# Patient Record
Sex: MALE | Race: WHITE | Employment: PART TIME | URBAN - METROPOLITAN AREA
[De-identification: names, ages, dates, MRNs, and addresses within clinical notes are randomized per-mention and may not be internally consistent; named-entity substitution may affect disease eponyms.]

---

## 2017-08-25 ENCOUNTER — HOSPITAL ENCOUNTER (EMERGENCY)
Facility: HOSPITAL | Age: 22
Discharge: HOME | End: 2017-08-26
Attending: EMERGENCY MEDICINE | Admitting: SURGERY
Payer: COMMERCIAL

## 2017-08-25 ENCOUNTER — TELEPHONE (OUTPATIENT)
Dept: FAMILY MEDICINE CLINIC | Facility: CLINIC | Age: 22
End: 2017-08-25

## 2017-08-25 DIAGNOSIS — K37 APPENDICITIS: ICD-10-CM

## 2017-08-25 DIAGNOSIS — K35.80 ACUTE APPENDICITIS, UNSPECIFIED ACUTE APPENDICITIS TYPE: Primary | ICD-10-CM

## 2017-08-25 PROCEDURE — 96376 TX/PRO/DX INJ SAME DRUG ADON: CPT

## 2017-08-25 PROCEDURE — 96361 HYDRATE IV INFUSION ADD-ON: CPT

## 2017-08-25 PROCEDURE — 99285 EMERGENCY DEPT VISIT HI MDM: CPT

## 2017-08-25 PROCEDURE — 96375 TX/PRO/DX INJ NEW DRUG ADDON: CPT

## 2017-08-25 PROCEDURE — 83690 ASSAY OF LIPASE: CPT | Performed by: EMERGENCY MEDICINE

## 2017-08-25 PROCEDURE — 96374 THER/PROPH/DIAG INJ IV PUSH: CPT

## 2017-08-25 PROCEDURE — 85025 COMPLETE CBC W/AUTO DIFF WBC: CPT | Performed by: EMERGENCY MEDICINE

## 2017-08-25 PROCEDURE — 80053 COMPREHEN METABOLIC PANEL: CPT | Performed by: EMERGENCY MEDICINE

## 2017-08-25 RX ORDER — ONDANSETRON 2 MG/ML
4 INJECTION INTRAMUSCULAR; INTRAVENOUS ONCE
Status: COMPLETED | OUTPATIENT
Start: 2017-08-26 | End: 2017-08-26

## 2017-08-26 ENCOUNTER — SURGERY (OUTPATIENT)
Age: 22
End: 2017-08-26

## 2017-08-26 ENCOUNTER — ANESTHESIA (OUTPATIENT)
Dept: SURGERY | Facility: HOSPITAL | Age: 22
End: 2017-08-26
Payer: COMMERCIAL

## 2017-08-26 ENCOUNTER — APPOINTMENT (OUTPATIENT)
Dept: CT IMAGING | Facility: HOSPITAL | Age: 22
End: 2017-08-26
Attending: EMERGENCY MEDICINE
Payer: COMMERCIAL

## 2017-08-26 ENCOUNTER — ANESTHESIA EVENT (OUTPATIENT)
Dept: SURGERY | Facility: HOSPITAL | Age: 22
End: 2017-08-26
Payer: COMMERCIAL

## 2017-08-26 VITALS
TEMPERATURE: 99 F | RESPIRATION RATE: 19 BRPM | HEART RATE: 90 BPM | WEIGHT: 195 LBS | HEIGHT: 71 IN | DIASTOLIC BLOOD PRESSURE: 69 MMHG | OXYGEN SATURATION: 98 % | SYSTOLIC BLOOD PRESSURE: 132 MMHG | BODY MASS INDEX: 27.3 KG/M2

## 2017-08-26 PROBLEM — K35.80 ACUTE APPENDICITIS, UNSPECIFIED ACUTE APPENDICITIS TYPE: Status: ACTIVE | Noted: 2017-08-26

## 2017-08-26 PROBLEM — K35.80 ACUTE APPENDICITIS: Status: ACTIVE | Noted: 2017-08-26

## 2017-08-26 LAB
ALBUMIN SERPL-MCNC: 4.1 G/DL (ref 3.5–4.8)
ALP LIVER SERPL-CCNC: 51 U/L (ref 45–117)
ALT SERPL-CCNC: 34 U/L (ref 17–63)
AST SERPL-CCNC: 24 U/L (ref 15–41)
BASOPHILS # BLD AUTO: 0.06 X10(3) UL (ref 0–0.1)
BASOPHILS NFR BLD AUTO: 0.3 %
BILIRUB SERPL-MCNC: 0.6 MG/DL (ref 0.1–2)
BUN BLD-MCNC: 14 MG/DL (ref 8–20)
CALCIUM BLD-MCNC: 9.4 MG/DL (ref 8.3–10.3)
CHLORIDE: 105 MMOL/L (ref 101–111)
CO2: 25 MMOL/L (ref 22–32)
CREAT BLD-MCNC: 1.07 MG/DL (ref 0.7–1.3)
EOSINOPHIL # BLD AUTO: 0.17 X10(3) UL (ref 0–0.3)
EOSINOPHIL NFR BLD AUTO: 0.9 %
ERYTHROCYTE [DISTWIDTH] IN BLOOD BY AUTOMATED COUNT: 12.8 % (ref 11.5–16)
GLUCOSE BLD-MCNC: 102 MG/DL (ref 70–99)
HCT VFR BLD AUTO: 40.1 % (ref 37–53)
HGB BLD-MCNC: 13.9 G/DL (ref 13–17)
IMMATURE GRANULOCYTE COUNT: 0.09 X10(3) UL (ref 0–1)
IMMATURE GRANULOCYTE RATIO %: 0.5 %
LIPASE: 137 U/L (ref 73–393)
LYMPHOCYTES # BLD AUTO: 2.22 X10(3) UL (ref 0.9–4)
LYMPHOCYTES NFR BLD AUTO: 11.9 %
M PROTEIN MFR SERPL ELPH: 7.2 G/DL (ref 6.1–8.3)
MCH RBC QN AUTO: 29.7 PG (ref 27–33.2)
MCHC RBC AUTO-ENTMCNC: 34.7 G/DL (ref 31–37)
MCV RBC AUTO: 85.7 FL (ref 80–99)
MONOCYTES # BLD AUTO: 1.35 X10(3) UL (ref 0.1–0.6)
MONOCYTES NFR BLD AUTO: 7.2 %
NEUTROPHIL ABS PRELIM: 14.74 X10 (3) UL (ref 1.3–6.7)
NEUTROPHILS # BLD AUTO: 14.74 X10(3) UL (ref 1.3–6.7)
NEUTROPHILS NFR BLD AUTO: 79.2 %
PLATELET # BLD AUTO: 249 10(3)UL (ref 150–450)
POTASSIUM SERPL-SCNC: 3.4 MMOL/L (ref 3.6–5.1)
RBC # BLD AUTO: 4.68 X10(6)UL (ref 4.3–5.7)
RED CELL DISTRIBUTION WIDTH-SD: 39.7 FL (ref 35.1–46.3)
SODIUM SERPL-SCNC: 139 MMOL/L (ref 136–144)
WBC # BLD AUTO: 18.6 X10(3) UL (ref 4–13)

## 2017-08-26 PROCEDURE — 88304 TISSUE EXAM BY PATHOLOGIST: CPT | Performed by: SURGERY

## 2017-08-26 PROCEDURE — 74177 CT ABD & PELVIS W/CONTRAST: CPT | Performed by: EMERGENCY MEDICINE

## 2017-08-26 PROCEDURE — 0DTJ4ZZ RESECTION OF APPENDIX, PERCUTANEOUS ENDOSCOPIC APPROACH: ICD-10-PCS | Performed by: SURGERY

## 2017-08-26 RX ORDER — HYDROCODONE BITARTRATE AND ACETAMINOPHEN 5; 325 MG/1; MG/1
1-2 TABLET ORAL EVERY 4 HOURS PRN
Qty: 30 TABLET | Refills: 0 | Status: SHIPPED | OUTPATIENT
Start: 2017-08-26 | End: 2017-09-05

## 2017-08-26 RX ORDER — HYDROMORPHONE HYDROCHLORIDE 1 MG/ML
0.5 INJECTION, SOLUTION INTRAMUSCULAR; INTRAVENOUS; SUBCUTANEOUS EVERY 30 MIN PRN
Status: DISCONTINUED | OUTPATIENT
Start: 2017-08-26 | End: 2017-08-26

## 2017-08-26 RX ORDER — ONDANSETRON 2 MG/ML
4 INJECTION INTRAMUSCULAR; INTRAVENOUS EVERY 4 HOURS PRN
Status: CANCELLED | OUTPATIENT
Start: 2017-08-26

## 2017-08-26 RX ORDER — ONDANSETRON 2 MG/ML
4 INJECTION INTRAMUSCULAR; INTRAVENOUS AS NEEDED
Status: DISCONTINUED | OUTPATIENT
Start: 2017-08-26 | End: 2017-08-26

## 2017-08-26 RX ORDER — SODIUM CHLORIDE 9 MG/ML
INJECTION, SOLUTION INTRAVENOUS CONTINUOUS
Status: CANCELLED | OUTPATIENT
Start: 2017-08-26 | End: 2017-08-26

## 2017-08-26 RX ORDER — HYDROMORPHONE HYDROCHLORIDE 1 MG/ML
INJECTION, SOLUTION INTRAMUSCULAR; INTRAVENOUS; SUBCUTANEOUS
Status: COMPLETED
Start: 2017-08-26 | End: 2017-08-26

## 2017-08-26 RX ORDER — ACETAMINOPHEN 500 MG
1000 TABLET ORAL ONCE AS NEEDED
Status: DISCONTINUED | OUTPATIENT
Start: 2017-08-26 | End: 2017-08-26

## 2017-08-26 RX ORDER — DIPHENHYDRAMINE HYDROCHLORIDE 50 MG/ML
12.5 INJECTION INTRAMUSCULAR; INTRAVENOUS AS NEEDED
Status: DISCONTINUED | OUTPATIENT
Start: 2017-08-26 | End: 2017-08-26

## 2017-08-26 RX ORDER — HYDROCODONE BITARTRATE AND ACETAMINOPHEN 5; 325 MG/1; MG/1
1 TABLET ORAL AS NEEDED
Status: DISCONTINUED | OUTPATIENT
Start: 2017-08-26 | End: 2017-08-26

## 2017-08-26 RX ORDER — HYDROMORPHONE HYDROCHLORIDE 1 MG/ML
0.5 INJECTION, SOLUTION INTRAMUSCULAR; INTRAVENOUS; SUBCUTANEOUS EVERY 30 MIN PRN
Status: CANCELLED | OUTPATIENT
Start: 2017-08-26 | End: 2017-08-26

## 2017-08-26 RX ORDER — MEPERIDINE HYDROCHLORIDE 25 MG/ML
12.5 INJECTION INTRAMUSCULAR; INTRAVENOUS; SUBCUTANEOUS AS NEEDED
Status: DISCONTINUED | OUTPATIENT
Start: 2017-08-26 | End: 2017-08-26

## 2017-08-26 RX ORDER — BUPIVACAINE HYDROCHLORIDE AND EPINEPHRINE 2.5; 5 MG/ML; UG/ML
INJECTION, SOLUTION EPIDURAL; INFILTRATION; INTRACAUDAL; PERINEURAL AS NEEDED
Status: DISCONTINUED | OUTPATIENT
Start: 2017-08-26 | End: 2017-08-26

## 2017-08-26 RX ORDER — HYDROMORPHONE HYDROCHLORIDE 1 MG/ML
0.4 INJECTION, SOLUTION INTRAMUSCULAR; INTRAVENOUS; SUBCUTANEOUS EVERY 5 MIN PRN
Status: DISCONTINUED | OUTPATIENT
Start: 2017-08-26 | End: 2017-08-26

## 2017-08-26 RX ORDER — MIDAZOLAM HYDROCHLORIDE 1 MG/ML
1 INJECTION INTRAMUSCULAR; INTRAVENOUS EVERY 5 MIN PRN
Status: DISCONTINUED | OUTPATIENT
Start: 2017-08-26 | End: 2017-08-26

## 2017-08-26 RX ORDER — NALOXONE HYDROCHLORIDE 0.4 MG/ML
80 INJECTION, SOLUTION INTRAMUSCULAR; INTRAVENOUS; SUBCUTANEOUS AS NEEDED
Status: DISCONTINUED | OUTPATIENT
Start: 2017-08-26 | End: 2017-08-26

## 2017-08-26 RX ORDER — SODIUM CHLORIDE, SODIUM LACTATE, POTASSIUM CHLORIDE, CALCIUM CHLORIDE 600; 310; 30; 20 MG/100ML; MG/100ML; MG/100ML; MG/100ML
INJECTION, SOLUTION INTRAVENOUS CONTINUOUS
Status: DISCONTINUED | OUTPATIENT
Start: 2017-08-26 | End: 2017-08-26

## 2017-08-26 RX ORDER — HYDROCODONE BITARTRATE AND ACETAMINOPHEN 5; 325 MG/1; MG/1
2 TABLET ORAL AS NEEDED
Status: DISCONTINUED | OUTPATIENT
Start: 2017-08-26 | End: 2017-08-26

## 2017-08-26 NOTE — ED PROVIDER NOTES
Patient Seen in: [facility] Emergency Department    History   Patient presents with:  Abdomen/Flank Pain (GI/)    Stated Complaint: mid abd pain    HPI    25-year-old male presents the emergency department for complaints of abdominal pain.   Jamie Normocephalic, atraumatic. Pupils are equally round and reactive to light. Extraocular movements are intact. Oropharynx is clear. Uvula is midline. Tympanic membranes are clear without evidence of injection or perforation. Neck exam: Supple.   There i Course  ------------------------------------------------------------   Patient was brought back to the examination room immediately. The patient was then placed on a cardiac monitor and pulse oximetry was applied.   Patient had an IV established and labs w

## 2017-08-26 NOTE — OPERATIVE REPORT
PREOPERATIVE DIAGNOSIS: Right lower quadrant abdominal pain. POSTOPERATIVE DIAGNOSIS: Acute appendicitis. PROCEDURE PERFORMED: Laparoscopic appendectomy. ASSISTANT: None. ANESTHESIA: General.     ESTIMATED BLOOD LOSS: 10 mL.     COMPLICATIONS: N removed. There was no evidence of any ongoing bleeding at the end of procedure. The abdomen was then desufflated. The fascia and 11 mm trocar site was then reapproximated using interrupted Maxon stitch.  All wounds were then closed using subcuticular Monocr

## 2017-08-26 NOTE — ED INITIAL ASSESSMENT (HPI)
Patient sts after he was golfing today had abd pain umbilical pain, + emesis x3- denies diarrhea. Denies fevers.

## 2017-08-26 NOTE — TELEPHONE ENCOUNTER
Mom paged after hours, pt w/ acute onset of mid/jake-umbilical abd pain and cramping starting about one hour ago. Had multiple bouts of emesis. Having nausea. No diarrhea. No fevers. Has been having chills. Tried some Tums without improvement.   Pain

## 2017-08-26 NOTE — ANESTHESIA PREPROCEDURE EVALUATION
PRE-OP EVALUATION    Patient Name: Charles Goyal    Pre-op Diagnosis: Appendicitis [K37]    Procedure(s):  LAPAROSCOPIC APPENDECTOMY POSSIBLE OPEN    Surgeon(s) and Role:     Cayden Wild MD - Primary    Pre-op vitals reviewed.   Temp: 98.9 °F (37.2 °C) 08/25/2017   K 3.4 (L) 08/25/2017    08/25/2017   CO2 25.0 08/25/2017   BUN 14 08/25/2017   CREATSERUM 1.07 08/25/2017    (H) 08/25/2017   CA 9.4 08/25/2017            Airway      Mallampati: II  Mouth opening: >3 FB  TM distance: > 6 cm  Neck

## 2017-08-26 NOTE — H&P
[facility]  Report of Consultation    Eddy Sepulveda Patient Status:  Emergency    1995 MRN [id number]   Location [facility] Attending Silvia Hearn MD   Hosp Day # 0 PCP Margarito Tao MD     Reason for Consultation:  RL (DEMEROL) 25 MG/ML injection 12.5 mg, 12.5 mg, Intravenous, PRN  •  DiphenhydrAMINE HCl (BENADRYL) injection 12.5 mg, 12.5 mg, Intravenous, PRN  •  Midazolam HCl (VERSED) 2 MG/2ML injection 1 mg, 1 mg, Intravenous, Q5 Min PRN    Review of Systems:  Pertine Mother at bedside. I spent 40 minutes face to face with the patient. More than 50% of that time was spent counseling the patient and/or on coordination of care. The diagnosis, prognosis, and general treatment was explained to the patient and the family.

## 2017-08-28 NOTE — TELEPHONE ENCOUNTER
Called and spoke to mother, states patient is back in school and does have a follow up appointment with surgeon in 2 weeks.

## 2017-08-30 ENCOUNTER — TELEPHONE (OUTPATIENT)
Dept: SURGERY | Facility: CLINIC | Age: 22
End: 2017-08-30

## 2017-08-30 ENCOUNTER — TELEPHONE (OUTPATIENT)
Dept: FAMILY MEDICINE CLINIC | Facility: CLINIC | Age: 22
End: 2017-08-30

## 2017-08-30 DIAGNOSIS — K35.890 OTHER ACUTE APPENDICITIS: ICD-10-CM

## 2017-08-30 DIAGNOSIS — K35.80 ACUTE APPENDICITIS, UNSPECIFIED ACUTE APPENDICITIS TYPE: Primary | ICD-10-CM

## 2017-08-30 DIAGNOSIS — Z90.49 S/P APPENDECTOMY: ICD-10-CM

## 2017-08-30 NOTE — TELEPHONE ENCOUNTER
Pt called today regarding his post op appointment for appendectomy on 8-. Patient went back to college in [place] and can not make it back for a post operative appointment. He wants to follow up with his PCP in [place] Dr. Selene Xavier.   I told him I would t

## 2017-08-30 NOTE — TELEPHONE ENCOUNTER
Fina Bui          8/30/17 12:31 PM   Note      Called Robin Grew back after I talked to Dr. Courtney Lacy.   Told patient he could follow up with his PCP in [place] postoperatively per Dr. Yasmani Mays          8/30/17 11:47 AM   Note      Pt nicole

## 2017-08-30 NOTE — TELEPHONE ENCOUNTER
**LEFT MSG ON FRONT OFFICE VM**    Did not speak to pt. Away at college and had an appendectomy last week. Has wound care and follow-up condition questions. Pls call. Chart noted:      Follow up with Osmel Alvarez MD (SURGERY, GENERAL) in 2 wee

## 2017-08-30 NOTE — TELEPHONE ENCOUNTER
Pt is back at school in [place]    Was not planning on seeing Dr Zuleyma De La Torre that he direct his questions to the surgical nurses as we are not able to see what his post op incisions look like  (sounds like drain was removed and there are just steri stri

## 2017-08-30 NOTE — TELEPHONE ENCOUNTER
Spoke with mother, pt is allowed to see a provider close to school per his insurance    They will schedule that appt and request records if needed from  Dr Kash Lyons    Mother made appt for Physical in [place] when son is home from school    Future Appointments

## 2017-08-30 NOTE — TELEPHONE ENCOUNTER
Pt's mom called. Asked if her son had called and if we will forward the required documents to the school. I explained, per the TE written in here, that all questions should be directed to the surgeon's office.     Mom was confused and asked again if we

## 2017-08-30 NOTE — TELEPHONE ENCOUNTER
Called Manuela back after I talked to Dr. Layton Haynes. Told patient he could follow up with his PCP in [place] postoperatively per Dr. Layton Haynes.

## 2017-08-30 NOTE — TELEPHONE ENCOUNTER
Pt is HMO and we have not seen him since 6/2016    Records from Surgery on 8/25/17 in EMR    Will place F/U referral to surgeon    Dr Christy Roberson do you want to see him also? No appointments schedule for any F/U at this time    No future appointments.

## 2017-08-31 ENCOUNTER — TELEPHONE (OUTPATIENT)
Dept: SURGERY | Facility: CLINIC | Age: 22
End: 2017-08-31

## 2017-08-31 NOTE — TELEPHONE ENCOUNTER
Mother called about post op appt in [place], asking for a referral to see PCP in [place]. Explained that we do not do referrals and that the option is that pt could be seen here.  Also spoke with Dr. Jevon Wood in [place] and pt has an appt there and necessary document

## 2017-09-05 ENCOUNTER — TELEPHONE (OUTPATIENT)
Dept: SURGERY | Facility: CLINIC | Age: 22
End: 2017-09-05

## 2017-09-05 NOTE — TELEPHONE ENCOUNTER
Spoke with pt again.  Advised that he really needs to direct his concerns to Dr Radha Osman office as we never saw his for a hosp F/U    Pt understands and will call Dr Radha Osman office    Pt states he also has F/U appt on Friday with the [place]

## 2017-09-05 NOTE — TELEPHONE ENCOUNTER
Patient called from [facility] in [place]. He had laparoscopic appendectomy done 8/26/17 with Dr. Lechuga Born at [facility]. C/o redness/white discharge from incision. Denies any fever, pain or warmth to the incision. Patient states he feels great.  Patient has a

## 2017-09-05 NOTE — TELEPHONE ENCOUNTER
Pt called from [address] [place] [address]. Has a question about one of his incisions. I explained I remember him calling last week and thought the surgeon's office was answering the calls. Pt stated he \"wasn't sure about that\".   I confirmed he knew he was calling hi

## 2017-09-29 NOTE — PROGRESS NOTES
[facility]    Patients Name: Margarito Sevilla  Attending Physician: Jamila att. providers found  CSN: [id number]    Location:  [address] [place] [address] OR [facility]/ OR [facility]*  MRN: [id number]    YOB: 1995  Admission Date: 8/25/2017     Anesthesia Post-op Note    Proce

## 2017-11-22 ENCOUNTER — OFFICE VISIT (OUTPATIENT)
Dept: FAMILY MEDICINE CLINIC | Facility: CLINIC | Age: 22
End: 2017-11-22

## 2017-11-22 VITALS
OXYGEN SATURATION: 98 % | SYSTOLIC BLOOD PRESSURE: 118 MMHG | HEIGHT: 71 IN | RESPIRATION RATE: 18 BRPM | DIASTOLIC BLOOD PRESSURE: 72 MMHG | TEMPERATURE: 98 F | WEIGHT: 207.5 LBS | HEART RATE: 70 BPM | BODY MASS INDEX: 29.05 KG/M2

## 2017-11-22 DIAGNOSIS — Z00.00 ROUTINE GENERAL MEDICAL EXAMINATION AT A HEALTH CARE FACILITY: Primary | ICD-10-CM

## 2017-11-22 DIAGNOSIS — Z23 NEED FOR VACCINATION: ICD-10-CM

## 2017-11-22 PROCEDURE — 90472 IMMUNIZATION ADMIN EACH ADD: CPT | Performed by: FAMILY MEDICINE

## 2017-11-22 PROCEDURE — 99395 PREV VISIT EST AGE 18-39: CPT | Performed by: FAMILY MEDICINE

## 2017-11-22 PROCEDURE — 90714 TD VACC NO PRESV 7 YRS+ IM: CPT | Performed by: FAMILY MEDICINE

## 2017-11-22 PROCEDURE — 90686 IIV4 VACC NO PRSV 0.5 ML IM: CPT | Performed by: FAMILY MEDICINE

## 2017-11-22 PROCEDURE — 90471 IMMUNIZATION ADMIN: CPT | Performed by: FAMILY MEDICINE

## 2017-11-22 NOTE — PROGRESS NOTES
Michael Miller is a 25year old male here for Patient presents with: Well Adult: Physical    HPI:   Patient is seen for annual physical.    Diet and exercise: states has been eating out more and not watching his diet, not exercising.     PAST MEDICAL HISTORY dysfunction or low libido. No testicular or scrotal swelling or tenderness. No nocturia or difficulty with urine stream.  Rheumatologic: no joint pain, swelling, stiffness. No myalgias. No history of autoimmune disorder.   Derm/Skin: No rash or atypical ski adult.    Diagnoses and all orders for this visit:    Routine general medical examination at a health care facility  -     CBC WITH DIFFERENTIAL WITH PLATELET; Future  -     ASSAY, THYROID STIM HORMONE; Future  -     LIPID PANEL;  Future  -     COMP METABOL

## 2017-11-22 NOTE — PATIENT INSTRUCTIONS
Prevention Guidelines, Men Ages 25 to 44  Screening tests and vaccines are an important part of managing your health. Health counseling is essential, too. Below are guidelines for these, for men ages 25 to 44.  Talk with your healthcare provider to make s Hepatitis B Men at increased risk for infection – talk with your healthcare provider 3 doses over 6 months; second dose should be given 1 month after the first dose; the third dose should be given at least 2 months after the second dose and at least 4 yu © 4774-3189 [employer] [address]. [address] [facility], [address] [place]. All rights reserved. This information is not intended as a substitute for professional medical care. Always follow your healthcare professional's instructions.         Eating

## 2018-07-24 ENCOUNTER — OFFICE VISIT (OUTPATIENT)
Dept: FAMILY MEDICINE CLINIC | Facility: CLINIC | Age: 23
End: 2018-07-24

## 2018-07-24 VITALS
RESPIRATION RATE: 18 BRPM | HEART RATE: 64 BPM | WEIGHT: 211.13 LBS | TEMPERATURE: 98 F | OXYGEN SATURATION: 98 % | BODY MASS INDEX: 28.91 KG/M2 | SYSTOLIC BLOOD PRESSURE: 122 MMHG | HEIGHT: 71.5 IN | DIASTOLIC BLOOD PRESSURE: 78 MMHG

## 2018-07-24 DIAGNOSIS — Z02.89 ENCOUNTER FOR COMPLETION OF FORM WITH PATIENT: Primary | ICD-10-CM

## 2018-07-24 DIAGNOSIS — Z11.1 SCREENING EXAMINATION FOR PULMONARY TUBERCULOSIS: ICD-10-CM

## 2018-07-24 PROBLEM — K35.80 ACUTE APPENDICITIS, UNSPECIFIED ACUTE APPENDICITIS TYPE: Status: RESOLVED | Noted: 2017-08-26 | Resolved: 2018-07-24

## 2018-07-24 PROBLEM — K35.80 ACUTE APPENDICITIS: Status: RESOLVED | Noted: 2017-08-26 | Resolved: 2018-07-24

## 2018-07-24 PROCEDURE — 99213 OFFICE O/P EST LOW 20 MIN: CPT | Performed by: FAMILY MEDICINE

## 2018-07-24 PROCEDURE — 86580 TB INTRADERMAL TEST: CPT | Performed by: FAMILY MEDICINE

## 2018-07-24 RX ORDER — FLUTICASONE PROPIONATE 50 MCG
SPRAY, SUSPENSION (ML) NASAL CONTINUOUS PRN
COMMUNITY

## 2018-07-24 NOTE — PROGRESS NOTES
Syed Valiente is a 26year old male. Patient presents with:  Complete Form: Statement of good health for employer. 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     ALLERGY:   N

## 2018-07-26 ENCOUNTER — APPOINTMENT (OUTPATIENT)
Dept: FAMILY MEDICINE CLINIC | Facility: CLINIC | Age: 23
End: 2018-07-26

## 2018-07-26 LAB
INDURATION (): 0 MM (ref 0–11)
NAME OF  READER: NORMAL

## 2020-07-02 ENCOUNTER — TELEPHONE (OUTPATIENT)
Dept: FAMILY MEDICINE CLINIC | Facility: CLINIC | Age: 25
End: 2020-07-02

## 2020-07-02 NOTE — TELEPHONE ENCOUNTER
Patient left message saying he has not seen dr in a long time ( not since 2018) , he said he needs a doctors note to allow him to get the covid test , said he is not sure if he needs one or not     Patient went to [facility] in [place] this past w

## 2020-07-06 ENCOUNTER — TELEPHONE (OUTPATIENT)
Dept: FAMILY MEDICINE CLINIC | Facility: CLINIC | Age: 25
End: 2020-07-06

## 2023-12-15 ENCOUNTER — HOSPITAL ENCOUNTER (EMERGENCY)
Facility: HOSPITAL | Age: 28
Discharge: HOME | End: 2023-12-15
Attending: EMERGENCY MEDICINE
Payer: COMMERCIAL

## 2023-12-15 ENCOUNTER — APPOINTMENT (OUTPATIENT)
Dept: ULTRASOUND IMAGING | Facility: HOSPITAL | Age: 28
End: 2023-12-15
Attending: EMERGENCY MEDICINE
Payer: COMMERCIAL

## 2023-12-15 ENCOUNTER — TELEPHONE (OUTPATIENT)
Dept: FAMILY MEDICINE CLINIC | Facility: CLINIC | Age: 28
End: 2023-12-15

## 2023-12-15 VITALS
BODY MASS INDEX: 30.1 KG/M2 | WEIGHT: 215 LBS | HEIGHT: 71 IN | DIASTOLIC BLOOD PRESSURE: 94 MMHG | SYSTOLIC BLOOD PRESSURE: 138 MMHG | HEART RATE: 66 BPM | RESPIRATION RATE: 18 BRPM | TEMPERATURE: 98 F | OXYGEN SATURATION: 98 %

## 2023-12-15 DIAGNOSIS — N43.3 HYDROCELE, UNSPECIFIED HYDROCELE TYPE: ICD-10-CM

## 2023-12-15 DIAGNOSIS — N50.811 PAIN IN RIGHT TESTICLE: ICD-10-CM

## 2023-12-15 LAB
BILIRUB UR QL STRIP.AUTO: NEGATIVE
CLARITY UR REFRACT.AUTO: CLEAR
GLUCOSE UR STRIP.AUTO-MCNC: NORMAL MG/DL
LEUKOCYTE ESTERASE UR QL STRIP.AUTO: NEGATIVE
NITRITE UR QL STRIP.AUTO: NEGATIVE
PH UR STRIP.AUTO: 6 [PH] (ref 5–8)
PROT UR STRIP.AUTO-MCNC: NEGATIVE MG/DL
RBC UR QL AUTO: NEGATIVE
SP GR UR STRIP.AUTO: 1.01 (ref 1–1.03)
UROBILINOGEN UR STRIP.AUTO-MCNC: NORMAL MG/DL

## 2023-12-15 PROCEDURE — 81003 URINALYSIS AUTO W/O SCOPE: CPT | Performed by: EMERGENCY MEDICINE

## 2023-12-15 PROCEDURE — 87591 N.GONORRHOEAE DNA AMP PROB: CPT | Performed by: EMERGENCY MEDICINE

## 2023-12-15 PROCEDURE — 87661 TRICHOMONAS VAGINALIS AMPLIF: CPT | Performed by: EMERGENCY MEDICINE

## 2023-12-15 PROCEDURE — 76870 US EXAM SCROTUM: CPT | Performed by: EMERGENCY MEDICINE

## 2023-12-15 PROCEDURE — 87491 CHLMYD TRACH DNA AMP PROBE: CPT | Performed by: EMERGENCY MEDICINE

## 2023-12-15 PROCEDURE — 93975 VASCULAR STUDY: CPT | Performed by: EMERGENCY MEDICINE

## 2023-12-15 PROCEDURE — 99284 EMERGENCY DEPT VISIT MOD MDM: CPT

## 2023-12-15 NOTE — TELEPHONE ENCOUNTER
Patient called on call number. Returned call. States he noted last night he had some swelling and pain in the right testicle. He was unable to fall asleep due to discomfort. 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 Is taking ibuprofen for pain. Denies any injury to the area. Urine clear yellow with no noted blood. Denies fever. Reviewed with Dr. Rose Marie Carrillo and advised to go to IC/ED for evaluation. Advised he has not been seen in the office since 2018. Once he is evaluated in the IC/ED call for appointment with Dr. Rose Marie Carrillo to reestablish care. She needs to see him to review his health status and to keep up with preventative care. Verbalizes understanding and will schedule appt.

## 2023-12-18 LAB
C TRACH DNA SPEC QL NAA+PROBE: NEGATIVE
N GONORRHOEA DNA SPEC QL NAA+PROBE: NEGATIVE
TRICH VAG NAA: NEGATIVE

## 2024-01-11 ENCOUNTER — OFFICE VISIT (OUTPATIENT)
Dept: SURGERY | Facility: CLINIC | Age: 29
End: 2024-01-11

## 2024-01-11 DIAGNOSIS — N45.1 EPIDIDYMITIS: Primary | ICD-10-CM

## 2024-01-11 DIAGNOSIS — R82.90 URINE FINDING: ICD-10-CM

## 2024-01-11 LAB
APPEARANCE: CLEAR
BILIRUBIN: NEGATIVE
GLUCOSE (URINE DIPSTICK): NEGATIVE MG/DL
KETONES (URINE DIPSTICK): NEGATIVE MG/DL
LEUKOCYTES: NEGATIVE
MULTISTIX LOT#: NORMAL NUMERIC
NITRITE, URINE: NEGATIVE
OCCULT BLOOD: NEGATIVE
PH, URINE: 6 (ref 4.5–8)
PROTEIN (URINE DIPSTICK): NEGATIVE MG/DL
SPECIFIC GRAVITY: 1.01 (ref 1–1.03)
URINE-COLOR: YELLOW
UROBILINOGEN,SEMI-QN: 0.2 MG/DL (ref 0–1.9)

## 2024-01-11 PROCEDURE — 99204 OFFICE O/P NEW MOD 45 MIN: CPT

## 2024-01-11 PROCEDURE — 81003 URINALYSIS AUTO W/O SCOPE: CPT

## 2024-01-11 RX ORDER — LORATADINE 10 MG/1
10 TABLET, ORALLY DISINTEGRATING ORAL DAILY
COMMUNITY

## 2024-01-11 NOTE — PROGRESS NOTES
[facility], [facility]    Urology Consult Note    History of Present Illness:   Patient is a(n) 28 year old male without significant pmhx presents for hydrocele consult.    Pt went to ED on 12/15/23 for right testicular pain that began when he was laying in bed. No urinary complaints. Scrotal US showed small bilateral hydroceles.     Pain has improved since ED visit and has been intermittent since then. No hx of similar sx. Has not done anything strenuous recently.     Has been trying to increase water intake, usually drinks lots of water. BM regular.     HISTORY:  Past Medical History:   Diagnosis Date    CHICKEN POX     Chicken pox       Past Surgical History:   Procedure Laterality Date    APPENDECTOMY  09/2017      Family History   Problem Relation Age of Onset    Diabetes Maternal Grandfather       Social History:   Social History     Socioeconomic History    Marital status:    Tobacco Use    Smoking status: Never    Smokeless tobacco: Never   Substance and Sexual Activity    Alcohol use: Yes     Alcohol/week: 0.0 standard drinks of alcohol     Comment: beer 1 a once a week 2-3 beers    Drug use: No    Sexual activity: Yes        Allergies  No Known Allergies    Review of Systems:   A 10-point review of systems was completed and is negative other than as noted above.    Physical Exam:   There were no vitals taken for this visit.    GENERAL APPEARANCE: no acute distress  NEUROLOGIC: converses appropriately  HEAD: atraumatic, normocephalic  LUNGS: non-labored breathing  ABDOMEN: soft, nontender, non-distended  BACK: no CVA tenderness  PSYCH: appropriate affect and mood    Results:     Laboratory Data:  Lab Results   Component Value Date    WBC 18.6 (H) 08/25/2017    HGB 13.9 08/25/2017    .0 08/25/2017     Lab Results   Component Value Date     08/25/2017    K 3.4 (L) 08/25/2017     08/25/2017    CO2 25.0 08/25/2017    BUN 14 08/25/2017     (H)  08/25/2017    AST 24 08/25/2017    ALT 34 08/25/2017    TP 7.2 08/25/2017    ALB 4.1 08/25/2017    CA 9.4 08/25/2017       Urinalysis Results (last 3 years):  Recent Labs     12/15/23  1833   COLORUR Light-Yellow   CLARITY Clear   SPECGRAVITY 1.013   PHURINE 6.0   PROUR Negative   GLUUR Normal   KETUR Trace*   BILUR Negative   BLOODURINE Negative   NITRITE Negative   UROBILINOGEN Normal   LEUUR Negative       Urine Culture Results (last 3 years):  No results found for: \"URINECUL\"    Imaging  US SCROTUM W/ DOPPLER (CPT=93975/05732)    Result Date: 12/15/2023  PROCEDURE:  US SCROTUM W/ DOPPLER (CPT=93975/00505)  COMPARISON:  None.  INDICATIONS:  Right testicular pain, eval for torsion  TECHNIQUE:  Real time grey scale ultrasound was performed of the scrotal contents including the testicles, epididymis, spermatic cord, and scrotal wall. A duplex scan with B-mode, Doppler color flow, and spectral analysis were also performed.  PATIENT STATED HISTORY: (As transcribed by Technologist)     FINDINGS:  TESTES:  Normal appearance with normal arterial and venous flow. EPIDIDYMIS:  Negative. HYDROCELE:  Small bilateral VARICOCELE:  Negative            CONCLUSION:  1. No sonographic evidence for testicular torsion as clinically question. 2. Small bilateral hydroceles.   LOCATION:  [facility]    Dictated by (CST): Landy Mares MD on 12/15/2023 at 6:37 PM     Finalized by (CST): Landy Mares MD on 12/15/2023 at 6:38 PM           Impression:   Recommendations:  Epididymitis  - discussed he likely has some component of epididymitis. Testicular pain treatment and prevention strategies provided along educational materials  - recommend he drink plenty of water (enough to keep urine clear)  - pt should wear an athletic supporter and avoid activities which may exacerbate pain such as sitting for prolonged periods of time, riding a bike/lawnmower/tractor, heavy lifting, hot baths/hot packs  - provided he has no medical contraindications to  NSAIDs, I also suggested he try this up to twice a day PRN  - if no improvement with above measures, will do PCR UTI and PFT referral    A total of 30 minutes were spent on the visit including chart review in preparation for the visit, time with the patient, placing orders and communication with other providers where appropriate.    Thank you very much for this consult. Please call if there are any questions or concerns.     Jennifer Myles PA-C  Urology  [facility]  Phone: [phone]    Date: 1/11/2024  Time: 2:26 PM

## 2024-01-11 NOTE — PATIENT INSTRUCTIONS
Epididymitis and Testicular Pain  The epididymis is a small tube next to the testicle that stores sperm. Inflammation of the epididymis can cause pain and swelling in your scrotum. The condition may be acute (sudden onset) or chronic (persisting for a longer period of time or recurrent).   - Acute epididymitis is typically characterized by sudden onset pain, tenderness, swelling and redness.  - Chronic epididymitis is typically characterized by intermittent or long-term dull ache in one or both testicles but the pain can become severe at times.    Causes  - Acute epididymitis is often caused by an infection. In sexually active men, it is often caused by a sexually transmitted disease (STD) such as chlamydia or gonorrhea. In boys and in men over 40, it can be from bacteria from other parts of the urinary tract (not an STD infection). It may also be caused by trauma.  - Chronic epididymitis often may not be associated with an infectious process. Things that may irritate the testicles include sitting for long periods of time, squats or dead-lifts, motorcycles or biking, inflammatory disorders of the pelvis and other painful conditions that can affect the pelvis such as chronic low back pain. Constipation and other bowel-related issues can contribute to chronic testicular pain.    Symptoms may begin with pain in the lower belly (abdomen) or low back. The pain then spreads down into the scrotum. Usually only one side is affected. The testicle and scrotum swell and become very painful and red. You may have fever and a burning when passing urine. Sometimes you may have a discharge from the penis.  Treatment is typically with antibiotics, and anti-inflammatory and pain medicines. The condition should get better over the first few days of treatment. But it will take several weeks for all the swelling and discomfort to go away. If your healthcare provider suspects that an STD is the cause, your sexual partners may need to  be treated.    Home care  The following will help you care for yourself at home:  Support the scrotum. When lying down, place a rolled towel under the scrotum. When walking, use an athletic supporter or 2 pairs of jockey-style underwear.  Rest at home until the fever is gone and you are feeling better.  If your healthcare gives you an antibiotic, take it exactly as you are told. Take it until it is all gone.  Avoid sitting at a 90 degree angle for long periods of time. Standing or reclining is preferable. Avoid sitting on anything that shakes (tractors, lawn-mowers, long car rides) if possible. You may use a donut while sitting to avoid excessive pressure on the testicles while sitting.  To relieve pain, put ice packs on the inflamed area. You can make your own ice pack by putting ice cubes in a sealed plastic bag wrapped in a thin towel.  Soaking in a hot bath or using a heating pad may help alleviated discomfort in men suffering from chronic epididymitis. Would avoid excessive heat in the setting of acute epididymitis.  You may use over-the-counter medicines to control pain, unless another medicine was given. Ibuprofen is typically a very effective anti-inflammatory pain medication. You may take 200-400 mg twice daily with plenty of water as needed for discomfort. If you have chronic liver or kidney disease, talk with your healthcare provider before taking these medicines. Also talk with your provider if you've ever had a stomach ulcer or GI bleeding.  Make sure chronic pain issues are under good control, especially back pain issues as this is a significant risk factor for chronic testicular/pelvic pain. Talk to your primary care or back specialist if your pain is not under adequate control.  Constipation can make you strain. This makes the pain worse. Avoid constipation by eating natural laxatives such as prunes, fresh fruits, and whole-grain cereals. If necessary, use a mild over-the-counter laxative such as  colace for constipation. Mineral oil can be used to keep the stools soft.    When to seek medical advice  Call your healthcare provider right away if any of these occur:  Fever of 100.4ºF (38ºC), or as directed by your healthcare provider  Increasing pain or swelling of the testicle after starting treatment  Pressure or pain in your bladder that gets worse  Unable to pass urine for 8 hours    Date Last Reviewed: 7/1/2020  © 7409-9412 [employer]. [address] [place] [address]. All rights reserved. This information is not intended as a substitute for professional medical care. Always follow your healthcare professional's instructions.

## 2024-05-23 ENCOUNTER — HOSPITAL ENCOUNTER (OUTPATIENT)
Age: 29
Discharge: HOME | End: 2024-05-23

## 2024-05-23 VITALS
OXYGEN SATURATION: 98 % | DIASTOLIC BLOOD PRESSURE: 87 MMHG | BODY MASS INDEX: 31.5 KG/M2 | HEART RATE: 64 BPM | RESPIRATION RATE: 18 BRPM | TEMPERATURE: 98 F | HEIGHT: 71 IN | SYSTOLIC BLOOD PRESSURE: 136 MMHG | WEIGHT: 225 LBS

## 2024-05-23 DIAGNOSIS — J01.41 ACUTE RECURRENT PANSINUSITIS: Primary | ICD-10-CM

## 2024-05-23 PROCEDURE — 99203 OFFICE O/P NEW LOW 30 MIN: CPT | Performed by: NURSE PRACTITIONER

## 2024-05-23 RX ORDER — AMOXICILLIN AND CLAVULANATE POTASSIUM 875; 125 MG/1; MG/1
1 TABLET, FILM COATED ORAL 2 TIMES DAILY
Qty: 20 TABLET | Refills: 0 | Status: SHIPPED | OUTPATIENT
Start: 2024-05-23 | End: 2024-06-02

## 2024-05-23 NOTE — DISCHARGE INSTRUCTIONS
Continue Flonase and antihistamines.  Sudafed if it helps.  If you are still having symptoms in the next few days please start course of antibiotics.  Follow-up with your primary as needed.

## 2024-05-23 NOTE — ED PROVIDER NOTES
Patient Seen in: [facility]      History     Chief Complaint   Patient presents with    Sinus Problem     Stated Complaint: sinus sym    Subjective:   This 29-year-old male with no significant past medical history.  Presents to immediate care for sinus pain and pressure, nasal congestion, tooth pain, and pain in his right ear.  Symptoms ongoing for 6 days.  Feels like previous sinus infections.  Denies any coughing, difficulty breathing, or wheezing.  Denies any fevers.  He has been using antihistamines, Sudafed, and NyQuil with little relief.    The history is provided by the patient.           Objective:   Past Medical History:    CHICKEN POX    Chicken pox              Past Surgical History:   Procedure Laterality Date    Appendectomy  09/2017                Social History     Socioeconomic History    Marital status:    Tobacco Use    Smoking status: Never    Smokeless tobacco: Never   Substance and Sexual Activity    Alcohol use: Yes     Alcohol/week: 0.0 standard drinks of alcohol     Comment: beer 1 a once a week 2-3 beers    Drug use: No    Sexual activity: Yes              Review of Systems   Constitutional:  Negative for chills and fever.   HENT:  Positive for congestion, ear pain, sinus pressure and sinus pain. Negative for ear discharge and sore throat.    Respiratory:  Negative for cough, shortness of breath and wheezing.    Cardiovascular:  Negative for chest pain, palpitations and leg swelling.   Gastrointestinal:  Negative for abdominal pain, constipation, diarrhea, nausea and vomiting.   Genitourinary:  Negative for dysuria.   Musculoskeletal:  Negative for back pain, neck pain and neck stiffness.   Skin:  Negative for rash.   Allergic/Immunologic: Negative for environmental allergies.   Neurological:  Negative for headaches.   Hematological:  Does not bruise/bleed easily.       Positive for stated complaint: sinus sym  Other systems are as noted in HPI.  Constitutional  and vital signs reviewed.      All other systems reviewed and negative except as noted above.    Physical Exam     ED Triage Vitals [05/23/24 1829]   /87   Pulse 64   Resp 18   Temp 98.2 °F (36.8 °C)   Temp src Temporal   SpO2 98 %   O2 Device None (Room air)       Current Vitals:   Vital Signs  BP: 136/87  Pulse: 64  Resp: 18  Temp: 98.2 °F (36.8 °C)  Temp src: Temporal    Oxygen Therapy  SpO2: 98 %  O2 Device: None (Room air)            Physical Exam  Vitals and nursing note reviewed.   Constitutional:       General: He is not in acute distress.     Appearance: Normal appearance. He is not ill-appearing, toxic-appearing or diaphoretic.   HENT:      Head: Normocephalic and atraumatic.      Right Ear: Tympanic membrane, ear canal and external ear normal. There is no impacted cerumen.      Left Ear: Tympanic membrane, ear canal and external ear normal. There is no impacted cerumen.      Nose: Congestion and rhinorrhea present.      Mouth/Throat:      Mouth: Mucous membranes are moist.      Pharynx: Oropharynx is clear. Posterior oropharyngeal erythema present. No oropharyngeal exudate.   Eyes:      General:         Right eye: No discharge.         Left eye: No discharge.      Extraocular Movements: Extraocular movements intact.      Conjunctiva/sclera: Conjunctivae normal.   Cardiovascular:      Rate and Rhythm: Normal rate and regular rhythm.      Heart sounds: Normal heart sounds. No murmur heard.  Pulmonary:      Effort: Pulmonary effort is normal. No respiratory distress.      Breath sounds: Normal breath sounds. No stridor. No wheezing, rhonchi or rales.   Musculoskeletal:      Cervical back: Neck supple.      Right lower leg: No edema.      Left lower leg: No edema.   Skin:     General: Skin is warm and dry.      Capillary Refill: Capillary refill takes less than 2 seconds.      Findings: No rash.   Neurological:      Mental Status: He is alert and oriented to person, place, and time.   Psychiatric:          Mood and Affect: Mood normal.         Behavior: Behavior normal.               ED Course   Labs Reviewed - No data to display          DC home.          MDM      Vital signs stable. Patient is well-appearing and nontoxic looking. Patient presents to immediate care for sinus pain and pressure, nasal congestion, and right ear pain.     Differential diagnosis include bacterial sinusitis, viral sinusitis, otitis media, COVID, allergic rhinitis    Symptoms going for 6 days.  No evidence of otitis media on exam.    Clinical impression is bacterial versus viral sinusitis.    DC home.  Watchful waiting course of Augmentin prescribed. Recommended over-the-counter antihistamines and Flonase. Nasal saline rinses. Follow-up with PCP in 1 week. Reasons to seek emergent treatment reinforced. Patient verbalized understanding, and agreed with plan of care. All questions answered.                                     Medical Decision Making      Disposition and Plan     Clinical Impression:  1. Acute recurrent pansinusitis         Disposition:  Discharge  5/23/2024  6:37 pm    Follow-up:  Shazia Warren MD  [address] [place] [address]  [phone]      As needed          Medications Prescribed:  Discharge Medication List as of 5/23/2024  6:39 PM        START taking these medications    Details   amoxicillin clavulanate 875-125 MG Oral Tab Take 1 tablet by mouth 2 (two) times daily for 10 days., Print, Disp-20 tablet, R-0

## 2024-06-27 ENCOUNTER — OFFICE VISIT (OUTPATIENT)
Dept: FAMILY MEDICINE CLINIC | Facility: CLINIC | Age: 29
End: 2024-06-27

## 2024-06-27 ENCOUNTER — HOSPITAL ENCOUNTER (OUTPATIENT)
Age: 29
Discharge: HOME | End: 2024-06-27

## 2024-06-27 VITALS
WEIGHT: 225 LBS | DIASTOLIC BLOOD PRESSURE: 80 MMHG | HEART RATE: 83 BPM | BODY MASS INDEX: 30.48 KG/M2 | SYSTOLIC BLOOD PRESSURE: 130 MMHG | RESPIRATION RATE: 18 BRPM | HEIGHT: 72 IN | TEMPERATURE: 98 F | OXYGEN SATURATION: 97 %

## 2024-06-27 VITALS
OXYGEN SATURATION: 99 % | HEIGHT: 72 IN | WEIGHT: 225 LBS | SYSTOLIC BLOOD PRESSURE: 144 MMHG | RESPIRATION RATE: 18 BRPM | BODY MASS INDEX: 30.48 KG/M2 | TEMPERATURE: 98 F | DIASTOLIC BLOOD PRESSURE: 76 MMHG | HEART RATE: 71 BPM

## 2024-06-27 DIAGNOSIS — B34.9 VIRAL ILLNESS: Primary | ICD-10-CM

## 2024-06-27 DIAGNOSIS — H92.01 OTALGIA OF RIGHT EAR: Primary | ICD-10-CM

## 2024-06-27 LAB
S PYO AG THROAT QL: NEGATIVE
SARS-COV-2 RNA RESP QL NAA+PROBE: NOT DETECTED

## 2024-06-27 PROCEDURE — 3008F BODY MASS INDEX DOCD: CPT | Performed by: NURSE PRACTITIONER

## 2024-06-27 PROCEDURE — 99213 OFFICE O/P EST LOW 20 MIN: CPT | Performed by: NURSE PRACTITIONER

## 2024-06-27 PROCEDURE — 3079F DIAST BP 80-89 MM HG: CPT | Performed by: NURSE PRACTITIONER

## 2024-06-27 PROCEDURE — U0002 COVID-19 LAB TEST NON-CDC: HCPCS | Performed by: NURSE PRACTITIONER

## 2024-06-27 PROCEDURE — 87880 STREP A ASSAY W/OPTIC: CPT | Performed by: NURSE PRACTITIONER

## 2024-06-27 PROCEDURE — 3075F SYST BP GE 130 - 139MM HG: CPT | Performed by: NURSE PRACTITIONER

## 2024-06-27 NOTE — PROGRESS NOTES
CHIEF COMPLAINT:     Chief Complaint   Patient presents with    Ear Pain     Ear pain and clogged       HPI:   Andres Robert is a 29 year old male who presents to clinic today with complaints of right ear pain. Has had for  2-3   hours. Pain is described as clogged.  Felt sharp pressure like pain when coughing.  Patient reports remote history of ear infections.  Home treatment includes ibuprofen 600 mg - 40 min ago with slight relief.     Associated symptoms:  Patient denies decreased hearing. Patient denies hearing loss. Patient denies drainage. Patient denies use of Q-tips to clean the ears. Patient reports nasal congestion for about 1 week.  Has had a sore throat, cough and chest congestion for about 3 days.  Was just seen at [facility] 4 hrs ago and dx with viral illness.  Rapid covid and rapid strep were negative.  Has been treating other symptoms with flonase, claritin and walfed-pe.  No other sick contacts.  Reports recent antibiotics 1 month ago for sinus infection.     Current Outpatient Medications   Medication Sig Dispense Refill    loratadine 10 MG Oral Tablet Dispersible Take 1 tablet (10 mg total) by mouth daily.      Fluticasone Propionate 50 MCG/ACT Nasal Suspension by Each Nare route continuous prn for Rhinitis.        Past Medical History:    CHICKEN POX    Chicken pox      Social History:  Social History     Socioeconomic History    Marital status:    Tobacco Use    Smoking status: Never    Smokeless tobacco: Never   Vaping Use    Vaping status: Never Used   Substance and Sexual Activity    Alcohol use: Yes     Alcohol/week: 0.0 standard drinks of alcohol     Comment: beer 1 a once a week 2-3 beers    Drug use: No    Sexual activity: Yes        REVIEW OF SYSTEMS:   GENERAL: Feeling well otherwise.    SKIN: no unusual skin lesions or rashes  HEENT: See HPI  LUNGS: No shortness of breath or wheezing.  CARDIOVASCULAR: No chest pain, palpitations  GI: No N/V/C/D.  NEURO: denies dizziness    EXAM:    /80   Pulse 83   Temp 97.8 °F (36.6 °C)   Resp 18   Ht 6' (1.829 m)   Wt 225 lb (102.1 kg)   SpO2 97%   BMI 30.52 kg/m²     Physical Exam  Vitals reviewed.   Constitutional:       General: He is not in acute distress.     Appearance: Normal appearance. He is well-developed and well-groomed. He is not ill-appearing.   HENT:      Head: Normocephalic and atraumatic.      Right Ear: Hearing, ear canal and external ear normal. Tenderness (with palpation of eustacian tube on side of head behind ear lobe) present. Tympanic membrane is retracted. Tympanic membrane is not erythematous or bulging.      Left Ear: Hearing, tympanic membrane, ear canal and external ear normal.      Nose: Congestion and rhinorrhea present. Rhinorrhea is clear.      Right Sinus: No maxillary sinus tenderness or frontal sinus tenderness.      Left Sinus: No maxillary sinus tenderness or frontal sinus tenderness.      Mouth/Throat:      Lips: Pink.      Mouth: Mucous membranes are moist.      Pharynx: Oropharynx is clear. Uvula midline. Posterior oropharyngeal erythema present.      Tonsils: No tonsillar exudate. 0 on the right. 0 on the left.   Eyes:      Extraocular Movements: Extraocular movements intact.      Conjunctiva/sclera: Conjunctivae normal.   Cardiovascular:      Rate and Rhythm: Normal rate and regular rhythm.      Heart sounds: Normal heart sounds. No murmur heard.  Pulmonary:      Effort: Pulmonary effort is normal.      Breath sounds: Normal breath sounds.   Musculoskeletal:      Cervical back: Normal range of motion and neck supple.   Lymphadenopathy:      Cervical: No cervical adenopathy.   Skin:     General: Skin is warm and dry.      Findings: No rash.   Neurological:      General: No focal deficit present.      Mental Status: He is alert.           ASSESSMENT AND PLAN:   Andres Robert is a 29 year old male who presents with:    ASSESSMENT:  Encounter Diagnosis   Name Primary?    Otalgia of right ear Yes        PLAN:   Tylenol/Motrin for pain.    Warm compress for comfort.  Discussed proper technique to equalize ear pressure.  Comfort measures as described in Patient Instructions  Meds as listed below.      Meds & Refills for this Visit:  Requested Prescriptions      No prescriptions requested or ordered in this encounter       Risks, benefits, side effects of medication explained and discussed.     Call or return if symptoms worsen, or if fever of 100.4 or greater persists for 72 hours.  Patient voiced understand and is in agreement with treatment plan.    Patient Instructions   Eustachian tube problems  Written by the doctors and editors at [facility]     What is the eustachian tube? -- The eustachian tube is a tube that connects the middle ear (the part of the ear behind the eardrum) to the back of the nose and throat       Normally, the eustachian tube helps keep the air pressure inside the middle ear the same as the air pressure outside the middle ear. If there is a problem with the eustachian tube, the air pressure inside the middle ear won’t be the same as the air pressure outside it. This can damage the middle ear and cause ear pain, hearing loss, and other symptoms. “Ear barotrauma” is the medical term for when people have symptoms or damage in the middle ear because of air pressure differences.    Most eustachian tube problems are not serious. They usually last only a short time and get better on their own.    But eustachian tube problems sometimes lead to serious problems, such as:    ?A middle ear infection  ?A torn eardrum  ?Hearing loss  Long-term hearing loss from eustachian tube problems can also lead to language or speech problems in children.    What causes eustachian tube problems? -- Common causes of eustachian tube problems are:    ?Illnesses or conditions that make the eustachian tubes swollen or inflamed - These include colds, allergies, ear infections, or sinus infections. The sinuses are hollow  areas in the bones of the face.  ?Sudden air pressure changes - Sudden air pressure changes can happen when people fly in an airplane, scuba dive, or drive in the mountains.  ?Growths that block the eustachian tube  ?Being born with an abnormal eustachian tube    What are the symptoms of a eustachian tube problem? -- Common symptoms of a eustachian tube problem include:    ?Ear pain  ?Feeling pressure or fullness in the ear  ?Trouble hearing  ?Ringing in the ear  ?Feeling dizzy  Should I see a doctor or nurse? -- See your doctor or nurse if your symptoms are severe, get worse, or if they don’t go away after a few days.    Will I need tests? -- Probably not. Your doctor or nurse should be able to tell if you have a eustachian tube problem by learning about your symptoms and doing an exam.    If your symptoms are severe or last for a long time, your doctor or nurse might:    ?Have you see a special kind of doctor called an ear, nose, and throat doctor  ?Do tests to check your hearing  ?Do an imaging test - Imaging tests can create pictures of the inside of the body.    How are eustachian tube problems treated? -- Treatment depends on what’s causing the eustachian tube problem. Depending on your individual situation, your doctor might treat you with one or more of the following:    ?Nose sprays   -Nasal steroids such as Flonase or Nasonex   - OTC nasal sprays like Afrin (oxymetazoline) can be used every 12 hours for  NO MORE than 3 days.  Longer use leads to worsening congestion and blood  pressure issues. This medication should be avoided in patients with high blood  pressure  ?Antihistamines - These medicines are usually used to treat allergies. They help stop itching, sneezing, and runny nose symptoms. Examples: Claritin, Allegra, Zyrtec.   ?Decongestants - These medicines can help with stuffy nose symptoms. These should not be used if patient has underlying blood pressure issues (high blood pressure  etc.)   -Sudafed (pseudoephedrine)- according to package instructions.     ?Antibiotic medicines - Antibiotics are not needed to treat eustachian tube problems. But if you have an infection caused by bacteria in addition to your eustachian tube problems, your doctor can treat it with antibiotics.  ?Surgery - Most people do not need surgery for eustachian tube problems. But people might need surgery if their symptoms don’t get better with medicines or they have severe or long-term symptoms.

## 2024-06-27 NOTE — PATIENT INSTRUCTIONS
Eustachian tube problems  Written by the doctors and editors at [facility]     What is the eustachian tube? -- The eustachian tube is a tube that connects the middle ear (the part of the ear behind the eardrum) to the back of the nose and throat       Normally, the eustachian tube helps keep the air pressure inside the middle ear the same as the air pressure outside the middle ear. If there is a problem with the eustachian tube, the air pressure inside the middle ear won’t be the same as the air pressure outside it. This can damage the middle ear and cause ear pain, hearing loss, and other symptoms. “Ear barotrauma” is the medical term for when people have symptoms or damage in the middle ear because of air pressure differences.    Most eustachian tube problems are not serious. They usually last only a short time and get better on their own.    But eustachian tube problems sometimes lead to serious problems, such as:    ?A middle ear infection  ?A torn eardrum  ?Hearing loss  Long-term hearing loss from eustachian tube problems can also lead to language or speech problems in children.    What causes eustachian tube problems? -- Common causes of eustachian tube problems are:    ?Illnesses or conditions that make the eustachian tubes swollen or inflamed - These include colds, allergies, ear infections, or sinus infections. The sinuses are hollow areas in the bones of the face.  ?Sudden air pressure changes - Sudden air pressure changes can happen when people fly in an airplane, scuba dive, or drive in the mountains.  ?Growths that block the eustachian tube  ?Being born with an abnormal eustachian tube    What are the symptoms of a eustachian tube problem? -- Common symptoms of a eustachian tube problem include:    ?Ear pain  ?Feeling pressure or fullness in the ear  ?Trouble hearing  ?Ringing in the ear  ?Feeling dizzy  Should I see a doctor or nurse? -- See your doctor or nurse if your symptoms are severe, get worse,  or if they don’t go away after a few days.    Will I need tests? -- Probably not. Your doctor or nurse should be able to tell if you have a eustachian tube problem by learning about your symptoms and doing an exam.    If your symptoms are severe or last for a long time, your doctor or nurse might:    ?Have you see a special kind of doctor called an ear, nose, and throat doctor  ?Do tests to check your hearing  ?Do an imaging test - Imaging tests can create pictures of the inside of the body.    How are eustachian tube problems treated? -- Treatment depends on what’s causing the eustachian tube problem. Depending on your individual situation, your doctor might treat you with one or more of the following:    ?Nose sprays   -Nasal steroids such as Flonase or Nasonex   - OTC nasal sprays like Afrin (oxymetazoline) can be used every 12 hours for  NO MORE than 3 days.  Longer use leads to worsening congestion and blood  pressure issues. This medication should be avoided in patients with high blood  pressure  ?Antihistamines - These medicines are usually used to treat allergies. They help stop itching, sneezing, and runny nose symptoms. Examples: Claritin, Allegra, Zyrtec.   ?Decongestants - These medicines can help with stuffy nose symptoms. These should not be used if patient has underlying blood pressure issues (high blood pressure etc.)   -Sudafed (pseudoephedrine)- according to package instructions.     ?Antibiotic medicines - Antibiotics are not needed to treat eustachian tube problems. But if you have an infection caused by bacteria in addition to your eustachian tube problems, your doctor can treat it with antibiotics.  ?Surgery - Most people do not need surgery for eustachian tube problems. But people might need surgery if their symptoms don’t get better with medicines or they have severe or long-term symptoms.

## 2024-06-27 NOTE — DISCHARGE INSTRUCTIONS
Continue antihistamines in the morning with Flonase.  Benadryl at nighttime.  Mucinex Insta soothe throat lozenges.  Follow closely with your PCP.

## 2024-06-27 NOTE — ED PROVIDER NOTES
Patient Seen in: [facility]      History     Chief Complaint   Patient presents with    Headache    Sore Throat     Stated Complaint: sore throat /chest congesetion x 4 weeks    Subjective:   This is a 29-year-old male with below stated medical history.  Presents to immediate care for sore throat, headache, and chest congestion.  He was treated for sinus infection with course of Augmentin 1 month ago.  Reports symptoms 100% resolved.  1 week ago respiratory symptoms returned.  Reports mild cough with chest congestion that is worse in the morning.  No difficulty breathing or wheezing.  No fevers.  No voice change or stridor.  He has been taking antihistamines and Flonase with little relief.  No sick contacts.    The history is provided by the patient.           Objective:   Past Medical History:    CHICKEN POX    Chicken pox              No pertinent past surgical history.              No pertinent social history.            Review of Systems   Constitutional:  Negative for chills and fever.   HENT:  Positive for congestion and sore throat. Negative for ear discharge, ear pain, trouble swallowing and voice change.    Respiratory:  Positive for cough. Negative for shortness of breath and wheezing.    Cardiovascular:  Negative for chest pain, palpitations and leg swelling.   Gastrointestinal:  Negative for abdominal pain, constipation, diarrhea, nausea and vomiting.   Genitourinary:  Negative for dysuria.   Musculoskeletal:  Negative for back pain, neck pain and neck stiffness.   Skin:  Negative for rash.   Allergic/Immunologic: Negative for environmental allergies.   Neurological:  Negative for headaches.   Hematological:  Does not bruise/bleed easily.       Positive for stated Chief Complaint: Headache and Sore Throat    Other systems are as noted in HPI.  Constitutional and vital signs reviewed.      All other systems reviewed and negative except as noted above.    Physical Exam     ED Triage  Vitals [06/27/24 1317]   /76   Pulse 71   Resp 18   Temp 97.9 °F (36.6 °C)   Temp src Temporal   SpO2 99 %   O2 Device None (Room air)       Current Vitals:   Vital Signs  BP: 144/76  Pulse: 71  Resp: 18  Temp: 97.9 °F (36.6 °C)  Temp src: Temporal    Oxygen Therapy  SpO2: 99 %  O2 Device: None (Room air)            Physical Exam  Vitals and nursing note reviewed.   Constitutional:       General: He is not in acute distress.     Appearance: Normal appearance. He is well-developed and normal weight. He is not ill-appearing, toxic-appearing or diaphoretic.   HENT:      Head: Normocephalic and atraumatic.      Right Ear: Tympanic membrane, ear canal and external ear normal. No drainage, swelling or tenderness. No middle ear effusion. Tympanic membrane is not erythematous.      Left Ear: Tympanic membrane, ear canal and external ear normal. No drainage, swelling or tenderness.  No middle ear effusion. Tympanic membrane is not erythematous.      Nose: Congestion present. No rhinorrhea.      Mouth/Throat:      Mouth: Mucous membranes are moist. No oral lesions.      Pharynx: Oropharynx is clear. Uvula midline. Posterior oropharyngeal erythema present. No pharyngeal swelling, oropharyngeal exudate or uvula swelling.      Tonsils: No tonsillar exudate or tonsillar abscesses. 0 on the right. 0 on the left.   Eyes:      General:         Right eye: No discharge.         Left eye: No discharge.      Extraocular Movements: Extraocular movements intact.      Conjunctiva/sclera: Conjunctivae normal.   Cardiovascular:      Rate and Rhythm: Normal rate and regular rhythm.      Heart sounds: Normal heart sounds. No murmur heard.  Pulmonary:      Effort: Pulmonary effort is normal. No respiratory distress.      Breath sounds: Normal breath sounds. No stridor. No wheezing, rhonchi or rales.   Musculoskeletal:      Cervical back: Neck supple.      Right lower leg: No edema.      Left lower leg: No edema.   Skin:     General: Skin  is warm and dry.      Capillary Refill: Capillary refill takes less than 2 seconds.      Findings: No rash.   Neurological:      Mental Status: He is alert and oriented to person, place, and time.   Psychiatric:         Mood and Affect: Mood normal.         Behavior: Behavior normal.               ED Course     Labs Reviewed   POCT RAPID STREP - Normal   RAPID SARS-COV-2 BY PCR - Normal             Rapid strep, rapid covid.          MDM      Vital signs stable. Patient is well-appearing and nontoxic looking. Presents to immediate care for upper respiratory symptoms.    Differential diagnosis include but not limited to COVID, viral sinusitis, strep, influenza, other viral URI, pneumonia    No high fevers to suggest influenza.  Rapid strep and rapid COVID are negative.  Lung sounds clear bilaterally. No respiratory distress or hypoxia.  Posterior pharynx is mildly erythemic with no evidence of exudates or PTA.  No suspicion for pneumonia.       Clinical impression is viral URI    DC home. Recommended continue over-the-counter antihistamines and Flonase.  Recommended OTC throat lozenges.  Tylenol and/or ibuprofen for pain or fever. The importance of oral hydration and close follow-up with primary provider in 1 week discussed. Reasons to seek emergent treatment reinforced. Patient verbalized understanding, and agreed with plan of care. All questions answered.                                     Medical Decision Making      Disposition and Plan     Clinical Impression:  1. Viral illness         Disposition:  Discharge  6/27/2024  1:49 pm    Follow-up:  Shazia Warren MD  [address] [place] [id number]  [phone]    In 1 week            Medications Prescribed:  Discharge Medication List as of 6/27/2024  1:50 PM

## 2024-06-29 ENCOUNTER — OFFICE VISIT (OUTPATIENT)
Dept: FAMILY MEDICINE CLINIC | Facility: CLINIC | Age: 29
End: 2024-06-29

## 2024-06-29 VITALS
HEIGHT: 72 IN | OXYGEN SATURATION: 99 % | RESPIRATION RATE: 14 BRPM | DIASTOLIC BLOOD PRESSURE: 89 MMHG | BODY MASS INDEX: 30.48 KG/M2 | WEIGHT: 225 LBS | TEMPERATURE: 98 F | HEART RATE: 76 BPM | SYSTOLIC BLOOD PRESSURE: 134 MMHG

## 2024-06-29 DIAGNOSIS — J35.8 TONSILLITH: Primary | ICD-10-CM

## 2024-06-29 PROCEDURE — 3079F DIAST BP 80-89 MM HG: CPT | Performed by: NURSE PRACTITIONER

## 2024-06-29 PROCEDURE — 3075F SYST BP GE 130 - 139MM HG: CPT | Performed by: NURSE PRACTITIONER

## 2024-06-29 PROCEDURE — 99213 OFFICE O/P EST LOW 20 MIN: CPT | Performed by: NURSE PRACTITIONER

## 2024-06-29 PROCEDURE — 3008F BODY MASS INDEX DOCD: CPT | Performed by: NURSE PRACTITIONER

## 2024-06-29 RX ORDER — PSEUDOEPHEDRINE HYDROCHLORIDE 60 MG/1
60 TABLET, FILM COATED ORAL EVERY 4 HOURS PRN
COMMUNITY

## 2024-06-29 NOTE — PROGRESS NOTES
CHIEF COMPLAINT:     Chief Complaint   Patient presents with    Sore Throat     White spots in back of throat x 1 day          HPI:   Andres Robert is a 29 year old male presents to clinic with complaint of seeing spots on throat after being seen at  and [facility] 2 days ago for sore throat. Patient has had 1 days. Symptoms have been persisting since onset.  Patient reports following associated symptoms:  none. Sore throat is better today but patient is feeling stressed about spots in throat . Patient denies headache. Patient denies nausea.  Patient denies rash. Patient denies history of strep throat. Patient denies strep pharyngitis exposure. Treating symptoms with Flonase and OTC anti-histamines, Sudafed PRN.    Current Outpatient Medications   Medication Sig Dispense Refill    pseudoephedrine 60 MG Oral Tab Take 1 tablet (60 mg total) by mouth every 4 (four) hours as needed for congestion.      loratadine 10 MG Oral Tablet Dispersible Take 1 tablet (10 mg total) by mouth daily.      Fluticasone Propionate 50 MCG/ACT Nasal Suspension by Each Nare route continuous prn for Rhinitis.        Past Medical History:    CHICKEN POX    Chicken pox      Social History:  Social History     Socioeconomic History    Marital status:    Tobacco Use    Smoking status: Never    Smokeless tobacco: Never   Vaping Use    Vaping status: Never Used   Substance and Sexual Activity    Alcohol use: Yes     Alcohol/week: 0.0 standard drinks of alcohol     Comment: beer 1 a once a week 2-3 beers    Drug use: No    Sexual activity: Yes        REVIEW OF SYSTEMS:   GENERAL HEALTH: no change in appetite  SKIN: Per HPI  HEENT: denies ear pain, See HPI  RESPIRATORY: denies shortness of breath or wheezing  CARDIOVASCULAR: denies chest pain or palpitations   GI: denies vomiting or diarrhea  NEURO: denies dizziness or lightheadedness    EXAM:   /89 (BP Location: Left arm, Patient Position: Sitting, Cuff Size: adult)   Pulse 76   Temp  98.4 °F (36.9 °C) (Oral)   Resp 14   Ht 6' (1.829 m)   Wt 225 lb (102.1 kg)   SpO2 99%   BMI 30.52 kg/m²   GENERAL: well developed, well nourished,in no apparent distress  SKIN: no rashes,no suspicious lesions  HEAD: atraumatic, normocephalic  EYES: conjunctiva clear, EOM intact  EARS: TM's clear, non-injected, no bulging, retraction, or fluid bilaterally  NOSE: nostrils patent, clear nasal discharge, nasal mucosa pink and non-inflamed  THROAT: oral mucosa pink, moist. Posterior pharynx is not erythematous or injected. No exudates. Tonsils 2/4.  Breath is not malodorous. Single tonsillith noted on right tonsil.   NECK: supple  LUNGS: clear to auscultation bilaterally, no wheezes or rhonchi. Breathing is non labored.  CARDIO: RRR without murmur  EXTREMITIES: no cyanosis, clubbing or edema  LYMPH: No anterior cervical or submandibular lymphadenopathy.  No posterior cervical or occipital lymphadenopathy.      ASSESSMENT AND PLAN:   Assessment:     Encounter Diagnosis   Name Primary?    Tonsillith Yes       Meds & Refills for this Visit:  Requested Prescriptions      No prescriptions requested or ordered in this encounter       Imaging & Consults:  None      Plan: Discussed that due to symptoms and negative rapid strep 2 days ago this is most likely viral and does not require antibiotics. Offered to re-swab and send culture, patient declined.    Recommend salt water gargles and ibuprofen PRN. Reassurance provided.     Discussed possibility of mononucleosis infection, but at this time symptoms are not reflective of mono infection.  If s/sx persist will consider MonoSpot or further lab work.     Comfort measures explained and discussed as listed in Patient Instructions    Follow up with PCP in 3-5 days if not improving, condition worsens, or fever greater than or equal to 100.4 persists for 72 hours.    The patient/parent indicates understanding of these issues and agrees to the plan.

## 2024-07-01 ENCOUNTER — OFFICE VISIT (OUTPATIENT)
Dept: FAMILY MEDICINE CLINIC | Facility: CLINIC | Age: 29
End: 2024-07-01
Payer: COMMERCIAL

## 2024-07-01 ENCOUNTER — TELEPHONE (OUTPATIENT)
Dept: INTERNAL MEDICINE CLINIC | Facility: CLINIC | Age: 29
End: 2024-07-01

## 2024-07-01 VITALS
OXYGEN SATURATION: 98 % | SYSTOLIC BLOOD PRESSURE: 130 MMHG | RESPIRATION RATE: 18 BRPM | BODY MASS INDEX: 30.48 KG/M2 | TEMPERATURE: 97 F | DIASTOLIC BLOOD PRESSURE: 84 MMHG | WEIGHT: 225 LBS | HEIGHT: 72 IN | HEART RATE: 78 BPM

## 2024-07-01 DIAGNOSIS — J06.9 VIRAL UPPER RESPIRATORY TRACT INFECTION: ICD-10-CM

## 2024-07-01 DIAGNOSIS — H66.012 ACUTE SUPPR OTITIS MEDIA W SPON RUPT EAR DRUM, LEFT EAR: Primary | ICD-10-CM

## 2024-07-01 DIAGNOSIS — J30.1 SEASONAL ALLERGIC RHINITIS DUE TO POLLEN: ICD-10-CM

## 2024-07-01 PROCEDURE — 3075F SYST BP GE 130 - 139MM HG: CPT | Performed by: FAMILY MEDICINE

## 2024-07-01 PROCEDURE — 3008F BODY MASS INDEX DOCD: CPT | Performed by: FAMILY MEDICINE

## 2024-07-01 PROCEDURE — 3079F DIAST BP 80-89 MM HG: CPT | Performed by: FAMILY MEDICINE

## 2024-07-01 PROCEDURE — 99213 OFFICE O/P EST LOW 20 MIN: CPT | Performed by: FAMILY MEDICINE

## 2024-07-01 RX ORDER — AMOXICILLIN 875 MG/1
875 TABLET, COATED ORAL 2 TIMES DAILY
Qty: 20 TABLET | Refills: 0 | Status: SHIPPED | OUTPATIENT
Start: 2024-07-01 | End: 2024-07-11

## 2024-07-01 NOTE — TELEPHONE ENCOUNTER
Called and spoke to pt. Per pt, he started with L ear congestion on Friday evening, some clear discharge from ear yesterday. Pt did have some sharp pains in L ear, but this is improved at this time. Denies fever. Only other sxs is a cough for past 3 days. Stated he does see some white spots in back of throat, but denies throat pain. Pt was seen in [facility] on 6/27 for R ear congestion, seen in [facility] again on 6/29. Per pt, he spoke to Dr. Warren on Saturday and she told pt she would like to see him on Monday. No open appts except for appt time right now. No other appts until Friday. Informed pt we will send update to Dr. Warren and call him back.     Dr. Warren, would you like to add pt?

## 2024-07-01 NOTE — TELEPHONE ENCOUNTER
Patient paged this morning- message states pain in left ear and congestion. Please call patient to triage.

## 2024-07-01 NOTE — PROGRESS NOTES
Andres Robert is a 29 year old male.  Chief Complaint   Patient presents with    Ear Problem     HPI:   Andres Robert is a 29 year old male with history of seasonal allergies complaining of sore throat, bilateral ear pain .Sx started Thursday of last week, sore throat, cough, pain in the right ear and now in the left ear, no nasal congestion just clear rhinorrhea, states is coughing up yellow/green sputum. Went to    Last night had clear drainage from the left ear, felt a sharp intense pain before he had the drainage, pain is better now but feels clogged.    Patient states was treated for a sinus infection end of May and felt better up until last week, has been taking Claritin daily and using his Flonase, just started taking sudafed.    Denies any fever, SOB.    ALLERGY:   No Known Allergies    MEDICATIONS:     Current Outpatient Medications   Medication Sig Dispense Refill    pseudoephedrine 60 MG Oral Tab Take 1 tablet (60 mg total) by mouth every 4 (four) hours as needed for congestion.      loratadine 10 MG Oral Tablet Dispersible Take 1 tablet (10 mg total) by mouth daily.      Fluticasone Propionate 50 MCG/ACT Nasal Suspension by Each Nare route continuous prn for Rhinitis.        Past Medical History:    CHICKEN POX    Chicken pox      Social History:  Social History     Socioeconomic History    Marital status:    Tobacco Use    Smoking status: Never    Smokeless tobacco: Never   Vaping Use    Vaping status: Never Used   Substance and Sexual Activity    Alcohol use: Yes     Alcohol/week: 0.0 standard drinks of alcohol     Comment: beer 1 a once a week 2-3 beers    Drug use: No    Sexual activity: Yes        REVIEW OF SYSTEMS:   A comprehensive 10 point review of systems was completed.  Pertinent positives and negatives noted in the the HPI.      EXAM:   /84   Pulse 78   Temp 97.2 °F (36.2 °C) (Temporal)   Resp 18   Ht 6' (1.829 m)   Wt 225 lb (102.1 kg)   SpO2 98%   BMI 30.52 kg/m²    GENERAL: well developed, well nourished,in no apparent distress  HEENT: normocephalic, atraumatic, bilateral external ear canals normal, right TM congested, left TM erythematous and retracted, + oropharyngeal erythema, cobblestoning, no visible post nasal drainage.  NECK: supple,no adenopathy  LUNGS: clear to auscultation  CARDIO: RRR without murmur    ASSESSMENT AND PLAN:   Andres was seen today for ear problem.    Diagnoses and all orders for this visit:    Acute suppr otitis media w spon rupt ear drum, left ear  -     amoxicillin 875 MG Oral Tab; Take 1 tablet (875 mg total) by mouth 2 (two) times daily for 10 days.    Viral upper respiratory tract infection        - encouraged to push fluids.    Seasonal allergic rhinitis due to pollen      - advised to switch to Allegra      - continue flonase and sudafed.    Return if symptoms worsen or fail to improve.    The 21st Century Cures Act makes medical notes like these available to patients in the interest of transparency. Please be advised this is a medical document. Medical documents are intended to carry relevant information, facts as evident, and the clinical opinion of the practitioner. The medical note is intended as peer to peer communication and may appear blunt or direct. It is written in medical language and may contain abbreviations or verbiage that are unfamiliar.

## 2024-07-01 NOTE — TELEPHONE ENCOUNTER
Called and spoke to pt, he is able to come now. Appt added to schedule.    High Dose Vitamin A Counseling: Side effects reviewed, pt to contact office should one occur. Tazorac Counseling:  Patient advised that medication is irritating and drying.  Patient may need to apply sparingly and wash off after an hour before eventually leaving it on overnight.  The patient verbalized understanding of the proper use and possible adverse effects of tazorac.  All of the patient's questions and concerns were addressed. Tetracycline Pregnancy And Lactation Text: This medication is Pregnancy Category D and not consider safe during pregnancy. It is also excreted in breast milk. Topical Sulfur Applications Counseling: Topical Sulfur Counseling: Patient counseled that this medication may cause skin irritation or allergic reactions.  In the event of skin irritation, the patient was advised to reduce the amount of the drug applied or use it less frequently.   The patient verbalized understanding of the proper use and possible adverse effects of topical sulfur application.  All of the patient's questions and concerns were addressed. Azithromycin Counseling:  I discussed with the patient the risks of azithromycin including but not limited to GI upset, allergic reaction, drug rash, diarrhea, and yeast infections. Doxycycline Pregnancy And Lactation Text: This medication is Pregnancy Category D and not consider safe during pregnancy. It is also excreted in breast milk but is considered safe for shorter treatment courses. Birth Control Pills Counseling: Birth Control Pill Counseling: I discussed with the patient the potential side effects of OCPs including but not limited to increased risk of stroke, heart attack, thrombophlebitis, deep venous thrombosis, hepatic adenomas, breast changes, GI upset, headaches, and depression.  The patient verbalized understanding of the proper use and possible adverse effects of OCPs. All of the patient's questions and concerns were addressed. Sarecycline Counseling: Patient advised regarding possible photosensitivity and discoloration of the teeth, skin, lips, tongue and gums.  Patient instructed to avoid sunlight, if possible.  When exposed to sunlight, patients should wear protective clothing, sunglasses, and sunscreen.  The patient was instructed to call the office immediately if the following severe adverse effects occur:  hearing changes, easy bruising/bleeding, severe headache, or vision changes.  The patient verbalized understanding of the proper use and possible adverse effects of sarecycline.  All of the patient's questions and concerns were addressed. Topical Retinoid Pregnancy And Lactation Text: This medication is Pregnancy Category C. It is unknown if this medication is excreted in breast milk. Isotretinoin Pregnancy And Lactation Text: This medication is Pregnancy Category X and is considered extremely dangerous during pregnancy. It is unknown if it is excreted in breast milk. Dapsone Counseling: I discussed with the patient the risks of dapsone including but not limited to hemolytic anemia, agranulocytosis, rashes, methemoglobinemia, kidney failure, peripheral neuropathy, headaches, GI upset, and liver toxicity.  Patients who start dapsone require monitoring including baseline LFTs and weekly CBCs for the first month, then every month thereafter.  The patient verbalized understanding of the proper use and possible adverse effects of dapsone.  All of the patient's questions and concerns were addressed. Spironolactone Counseling: Patient advised regarding risks of diarrhea, abdominal pain, hyperkalemia, birth defects (for female patients), liver toxicity and renal toxicity. The patient may need blood work to monitor liver and kidney function and potassium levels while on therapy. The patient verbalized understanding of the proper use and possible adverse effects of spironolactone.  All of the patient's questions and concerns were addressed. High Dose Vitamin A Pregnancy And Lactation Text: High dose vitamin A therapy is contraindicated during pregnancy and breast feeding. Tazorac Pregnancy And Lactation Text: This medication is not safe during pregnancy. It is unknown if this medication is excreted in breast milk. Detail Level: Zone Benzoyl Peroxide Counseling: Patient counseled that medicine may cause skin irritation and bleach clothing.  In the event of skin irritation, the patient was advised to reduce the amount of the drug applied or use it less frequently.   The patient verbalized understanding of the proper use and possible adverse effects of benzoyl peroxide.  All of the patient's questions and concerns were addressed. Topical Sulfur Applications Pregnancy And Lactation Text: This medication is Pregnancy Category C and has an unknown safety profile during pregnancy. It is unknown if this topical medication is excreted in breast milk. Azithromycin Pregnancy And Lactation Text: This medication is considered safe during pregnancy and is also secreted in breast milk. Erythromycin Counseling:  I discussed with the patient the risks of erythromycin including but not limited to GI upset, allergic reaction, drug rash, diarrhea, increase in liver enzymes, and yeast infections. Birth Control Pills Pregnancy And Lactation Text: This medication should be avoided if pregnant and for the first 30 days post-partum. Topical Clindamycin Counseling: Patient counseled that this medication may cause skin irritation or allergic reactions.  In the event of skin irritation, the patient was advised to reduce the amount of the drug applied or use it less frequently.   The patient verbalized understanding of the proper use and possible adverse effects of clindamycin.  All of the patient's questions and concerns were addressed. Isotretinoin Counseling: Patient should get monthly blood tests, not donate blood, not drive at night if vision affected, not share medication, and not undergo elective surgery for 6 months after tx completed. Side effects reviewed, pt to contact office should one occur. Dapsone Pregnancy And Lactation Text: This medication is Pregnancy Category C and is not considered safe during pregnancy or breast feeding. Minocycline Counseling: Patient advised regarding possible photosensitivity and discoloration of the teeth, skin, lips, tongue and gums.  Patient instructed to avoid sunlight, if possible.  When exposed to sunlight, patients should wear protective clothing, sunglasses, and sunscreen.  The patient was instructed to call the office immediately if the following severe adverse effects occur:  hearing changes, easy bruising/bleeding, severe headache, or vision changes.  The patient verbalized understanding of the proper use and possible adverse effects of minocycline.  All of the patient's questions and concerns were addressed. Benzoyl Peroxide Pregnancy And Lactation Text: This medication is Pregnancy Category C. It is unknown if benzoyl peroxide is excreted in breast milk. Bactrim Counseling:  I discussed with the patient the risks of sulfa antibiotics including but not limited to GI upset, allergic reaction, drug rash, diarrhea, dizziness, photosensitivity, and yeast infections.  Rarely, more serious reactions can occur including but not limited to aplastic anemia, agranulocytosis, methemoglobinemia, blood dyscrasias, liver or kidney failure, lung infiltrates or desquamative/blistering drug rashes. Erythromycin Pregnancy And Lactation Text: This medication is Pregnancy Category B and is considered safe during pregnancy. It is also excreted in breast milk. Tetracycline Counseling: Patient counseled regarding possible photosensitivity and increased risk for sunburn.  Patient instructed to avoid sunlight, if possible.  When exposed to sunlight, patients should wear protective clothing, sunglasses, and sunscreen.  The patient was instructed to call the office immediately if the following severe adverse effects occur:  hearing changes, easy bruising/bleeding, severe headache, or vision changes.  The patient verbalized understanding of the proper use and possible adverse effects of tetracycline.  All of the patient's questions and concerns were addressed. Patient understands to avoid pregnancy while on therapy due to potential birth defects. Include Pregnancy/Lactation Warning?: No Topical Clindamycin Pregnancy And Lactation Text: This medication is Pregnancy Category B and is considered safe during pregnancy. It is unknown if it is excreted in breast milk. Doxycycline Counseling:  Patient counseled regarding possible photosensitivity and increased risk for sunburn.  Patient instructed to avoid sunlight, if possible.  When exposed to sunlight, patients should wear protective clothing, sunglasses, and sunscreen.  The patient was instructed to call the office immediately if the following severe adverse effects occur:  hearing changes, easy bruising/bleeding, severe headache, or vision changes.  The patient verbalized understanding of the proper use and possible adverse effects of doxycycline.  All of the patient's questions and concerns were addressed. Topical Retinoid counseling:  Patient advised to apply a pea-sized amount only at bedtime and wait 30 minutes after washing their face before applying.  If too drying, patient may add a non-comedogenic moisturizer. The patient verbalized understanding of the proper use and possible adverse effects of retinoids.  All of the patient's questions and concerns were addressed. Spironolactone Pregnancy And Lactation Text: This medication can cause feminization of the male fetus and should be avoided during pregnancy. The active metabolite is also found in breast milk. Bactrim Pregnancy And Lactation Text: This medication is Pregnancy Category D and is known to cause fetal risk.  It is also excreted in breast milk.

## 2024-07-05 ENCOUNTER — OFFICE VISIT (OUTPATIENT)
Dept: OTOLARYNGOLOGY | Facility: CLINIC | Age: 29
End: 2024-07-05

## 2024-07-05 DIAGNOSIS — H69.92 DYSFUNCTION OF LEFT EUSTACHIAN TUBE: Primary | ICD-10-CM

## 2024-07-05 PROCEDURE — 99203 OFFICE O/P NEW LOW 30 MIN: CPT | Performed by: OTOLARYNGOLOGY

## 2024-07-05 RX ORDER — METHYLPREDNISOLONE 4 MG/1
TABLET ORAL
Qty: 21 TABLET | Refills: 0 | Status: SHIPPED | OUTPATIENT
Start: 2024-07-05

## 2024-07-05 RX ORDER — PSEUDOEPHEDRINE HCL 120 MG/1
120 TABLET, FILM COATED, EXTENDED RELEASE ORAL EVERY 12 HOURS
Qty: 60 TABLET | Refills: 3 | Status: SHIPPED | OUTPATIENT
Start: 2024-07-05

## 2024-07-05 NOTE — PROGRESS NOTES
Andres Robert is a 29 year old male.    Chief Complaint   Patient presents with    Ear Problem     Patient is here due to possible bilateral ear infection x 1 week.  Reports clear drainage.        HISTORY OF PRESENT ILLNESS  He presents with a history of recurrent problems with his ears he has had tubes once when he was a child but had at least 1 perhaps 2 myringotomies in the past for chronic middle ear fluid.  About a week ago he developed an acute infection in the may have been related to a congestive process of a recent sinus infection.  Went to immediate care was told that everything was fine went to his primary care physician after he developed some drainage from his ear after a crescendo of severe pain in that ear on Sunday.  On Monday he had drainage and the primary care physician prescribed him an antibiotic.  He is actually had significant improvement in his symptoms but still feels that things are plugged up in his left ear.  His hearing is down.  He will be flying next Tuesday.      Social History     Socioeconomic History    Marital status:    Tobacco Use    Smoking status: Never    Smokeless tobacco: Never   Vaping Use    Vaping status: Never Used   Substance and Sexual Activity    Alcohol use: Yes     Alcohol/week: 0.0 standard drinks of alcohol     Comment: beer 1 a once a week 2-3 beers    Drug use: No    Sexual activity: Yes       Family History   Problem Relation Age of Onset    Diabetes Maternal Grandfather        Past Medical History:    CHICKEN POX    Chicken pox       Past Surgical History:   Procedure Laterality Date    Appendectomy  09/2017         REVIEW OF SYSTEMS    System Neg/Pos Details   Constitutional Negative Fatigue, fever and weight loss.   ENMT Negative Drooling.   Eyes Negative Blurred vision and vision changes.   Respiratory Negative Dyspnea and wheezing.   Cardio Negative Chest pain, irregular heartbeat/palpitations and syncope.   GI Negative Abdominal pain and diarrhea.    Endocrine Negative Cold intolerance and heat intolerance.   Neuro Negative Tremors.   Psych Negative Anxiety and depression.   Integumentary Negative Frequent skin infections, pigment change and rash.   Hema/Lymph Negative Easy bleeding and easy bruising.           PHYSICAL EXAM    There were no vitals taken for this visit.       Constitutional Normal Overall appearance - Normal.   Psychiatric Normal Orientation - Oriented to time, place, person & situation. Appropriate mood and affect.   Neck Exam Normal Inspection - Normal. Palpation - Normal. Parotid gland - Normal. Thyroid gland - Normal.   Eyes Normal Conjunctiva - Right: Normal, Left: Normal. Pupil - Right: Normal, Left: Normal. Fundus - Right: Normal, Left: Normal.   Neurological Normal Memory - Normal. Cranial nerves - Cranial nerves II through XII grossly intact.   Head/Face Normal Facial features - Normal. Eyebrows - Normal. Skull - Normal.        Nasopharynx Normal External nose - Normal. Lips/teeth/gums - Normal. Tonsils - Normal. Oropharynx - Normal.   Ears Normal Inspection - Right: Normal, Left: Normal. Canal - Right: Normal, Left: Normal. TM - Right: Normal, Left: Middle ear fluid   Skin Normal Inspection - Normal.        Lymph Detail Normal Submental. Submandibular. Anterior cervical. Posterior cervical. Supraclavicular.        Nose/Mouth/Throat Normal External nose - Normal. Lips/teeth/gums - Normal. Tonsils - Normal. Oropharynx - Normal.   Nose/Mouth/Throat Normal Nares - Right: Normal Left: Normal. Septum -Normal  Turbinates - Right: Normal, Left: Normal.       Current Outpatient Medications:     pseudoephedrine  MG Oral Tablet 12 Hr, Take 1 tablet (120 mg total) by mouth every 12 (twelve) hours., Disp: 60 tablet, Rfl: 3    methylPREDNISolone 4 MG Oral Tablet Therapy Pack, Take by oral route., Disp: 21 tablet, Rfl: 0    amoxicillin 875 MG Oral Tab, Take 1 tablet (875 mg total) by mouth 2 (two) times daily for 10 days., Disp: 20 tablet,  Rfl: 0    pseudoephedrine 60 MG Oral Tab, Take 1 tablet (60 mg total) by mouth every 4 (four) hours as needed for congestion., Disp: , Rfl:     loratadine 10 MG Oral Tablet Dispersible, Take 1 tablet (10 mg total) by mouth daily., Disp: , Rfl:     Fluticasone Propionate 50 MCG/ACT Nasal Suspension, by Each Nare route continuous prn for Rhinitis., Disp: , Rfl:   ASSESSMENT AND PLAN    1. Dysfunction of left eustachian tube  Start Sudafed as well as methylprednisolone.  Will hold off on fluticasone as this actually caused him some problems with his ears.  Middle ear fluid noted on the left. Valsalva possible.  If no improvement by Monday on these meds and Afrin return to see me in my [place] clinic to perform myringotomy in 1 or both ears.  - pseudoephedrine  MG Oral Tablet 12 Hr; Take 1 tablet (120 mg total) by mouth every 12 (twelve) hours.  Dispense: 60 tablet; Refill: 3  - methylPREDNISolone 4 MG Oral Tablet Therapy Pack; Take by oral route.  Dispense: 21 tablet; Refill: 0        This note was prepared using Dragon Medical voice recognition dictation software. As a result errors may occur. When identified these errors have been corrected. While every attempt is made to correct errors during dictation discrepancies may still exist    Chin Henson MD    7/5/2024    1:55 PM

## 2024-07-08 ENCOUNTER — OFFICE VISIT (OUTPATIENT)
Dept: OTOLARYNGOLOGY | Facility: CLINIC | Age: 29
End: 2024-07-08

## 2024-07-08 DIAGNOSIS — H69.92 DYSFUNCTION OF LEFT EUSTACHIAN TUBE: Primary | ICD-10-CM

## 2024-07-08 PROCEDURE — 99213 OFFICE O/P EST LOW 20 MIN: CPT | Performed by: OTOLARYNGOLOGY

## 2024-07-08 NOTE — PROGRESS NOTES
Andres Robert is a 29 year old male.    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  About a week ago he developed an acute infection in the may have been related to a congestive process of a recent sinus infection.  Went to immediate care was told that everything was fine went to his primary care physician after he developed some drainage from his ear after a crescendo of severe pain in that ear on Sunday.  On Monday he had drainage and the primary care physician prescribed him an antibiotic.  He is actually had significant improvement in his symptoms but still feels that things are plugged up in his left ear.  His hearing is down.  He will be flying next Tuesday.     7/8/24 since I last saw him he has been using Sudafed fluticasone steroids as well as Afrin.  He states that this has really improved his ears completely he hears occasional crackling when he tries to pop his ears but otherwise his hearing essentially is back to normal.  Occasionally feels slight muffling of sound.  Congestion intranasal symptoms have improved as well.  He will be leaving for a flight tomorrow.  Has 1 more day of steroids and 2 more days of antibiotics.      Social History     Socioeconomic History    Marital status:    Tobacco Use    Smoking status: Never    Smokeless tobacco: Never   Vaping Use    Vaping status: Never Used   Substance and Sexual Activity    Alcohol use: Yes     Alcohol/week: 0.0 standard drinks of alcohol     Comment: beer 1 a once a week 2-3 beers    Drug use: No    Sexual activity: Yes       Family History   Problem Relation Age of Onset    Diabetes Maternal Grandfather        Past Medical History:     CHICKEN POX    Chicken pox       Past Surgical History:   Procedure Laterality Date    Appendectomy  09/2017         REVIEW OF SYSTEMS    System Neg/Pos Details   Constitutional Negative Fatigue, fever and weight loss.   ENMT Negative Drooling.   Eyes Negative Blurred vision and vision changes.   Respiratory Negative Dyspnea and wheezing.   Cardio Negative Chest pain, irregular heartbeat/palpitations and syncope.   GI Negative Abdominal pain and diarrhea.   Endocrine Negative Cold intolerance and heat intolerance.   Neuro Negative Tremors.   Psych Negative Anxiety and depression.   Integumentary Negative Frequent skin infections, pigment change and rash.   Hema/Lymph Negative Easy bleeding and easy bruising.           PHYSICAL EXAM    There were no vitals taken for this visit.       Constitutional Normal Overall appearance - Normal.   Psychiatric Normal Orientation - Oriented to time, place, person & situation. Appropriate mood and affect.   Neck Exam Normal Inspection - Normal. Palpation - Normal. Parotid gland - Normal. Thyroid gland - Normal.   Eyes Normal Conjunctiva - Right: Normal, Left: Normal. Pupil - Right: Normal, Left: Normal. Fundus - Right: Normal, Left: Normal.   Neurological Normal Memory - Normal. Cranial nerves - Cranial nerves II through XII grossly intact.   Head/Face Normal Facial features - Normal. Eyebrows - Normal. Skull - Normal.        Nasopharynx Normal External nose - Normal. Lips/teeth/gums - Normal. Tonsils - Normal. Oropharynx - Normal.   Ears Normal Inspection - Right: Normal, Left: Normal. Canal - Right: Normal, Left: Normal. TM - Right: Normal, Left: Normal.  No middle ear effusions noted bilaterally   Skin Normal Inspection - Normal.        Lymph Detail Normal Submental. Submandibular. Anterior cervical. Posterior cervical. Supraclavicular.        Nose/Mouth/Throat Normal External nose - Normal. Lips/teeth/gums - Normal. Tonsils - Normal. Oropharynx - Normal.   Nose/Mouth/Throat  Normal Nares - Right: Normal Left: Normal. Septum -Normal  Turbinates - Right: Normal, Left: Normal.       Current Outpatient Medications:     pseudoephedrine  MG Oral Tablet 12 Hr, Take 1 tablet (120 mg total) by mouth every 12 (twelve) hours., Disp: 60 tablet, Rfl: 3    methylPREDNISolone 4 MG Oral Tablet Therapy Pack, Take by oral route., Disp: 21 tablet, Rfl: 0    amoxicillin 875 MG Oral Tab, Take 1 tablet (875 mg total) by mouth 2 (two) times daily for 10 days., Disp: 20 tablet, Rfl: 0    pseudoephedrine 60 MG Oral Tab, Take 1 tablet (60 mg total) by mouth every 4 (four) hours as needed for congestion., Disp: , Rfl:     loratadine 10 MG Oral Tablet Dispersible, Take 1 tablet (10 mg total) by mouth daily., Disp: , Rfl:     Fluticasone Propionate 50 MCG/ACT Nasal Suspension, by Each Nare route continuous prn for Rhinitis., Disp: , Rfl:   ASSESSMENT AND PLAN    1. Dysfunction of left eustachian tube  Doing much better at this time.  Feels like his hearing is essentially back to normal.  Maybe a little bit of muffling of sound on occasion but overall feels much better.  I has 50 of Afrin continues to use Sudafed and has another day or 2 of steroids.  I did recommend continued use of Afrin and Sudafed before boarding his plane and using it on the way back.  Wean all other meds as tolerated return to see me if necessary.        This note was prepared using Dragon [employer] voice recognition dictation software. As a result errors may occur. When identified these errors have been corrected. While every attempt is made to correct errors during dictation discrepancies may still exist    Chin Henson MD    7/8/2024    6:58 PM

## 2024-08-12 ENCOUNTER — TELEPHONE (OUTPATIENT)
Dept: FAMILY MEDICINE CLINIC | Facility: CLINIC | Age: 29
End: 2024-08-12

## 2024-08-12 NOTE — TELEPHONE ENCOUNTER
Pt walked in late for his beatriz.  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

## 2024-09-24 ENCOUNTER — OFFICE VISIT (OUTPATIENT)
Dept: FAMILY MEDICINE CLINIC | Facility: CLINIC | Age: 29
End: 2024-09-24
Payer: COMMERCIAL

## 2024-09-24 VITALS
RESPIRATION RATE: 18 BRPM | BODY MASS INDEX: 31.29 KG/M2 | HEIGHT: 72 IN | SYSTOLIC BLOOD PRESSURE: 122 MMHG | HEART RATE: 84 BPM | DIASTOLIC BLOOD PRESSURE: 82 MMHG | OXYGEN SATURATION: 98 % | WEIGHT: 231 LBS | TEMPERATURE: 98 F

## 2024-09-24 DIAGNOSIS — Z00.00 ROUTINE GENERAL MEDICAL EXAMINATION AT A HEALTH CARE FACILITY: Primary | ICD-10-CM

## 2024-09-24 PROCEDURE — 3074F SYST BP LT 130 MM HG: CPT | Performed by: FAMILY MEDICINE

## 2024-09-24 PROCEDURE — 3079F DIAST BP 80-89 MM HG: CPT | Performed by: FAMILY MEDICINE

## 2024-09-24 PROCEDURE — 3008F BODY MASS INDEX DOCD: CPT | Performed by: FAMILY MEDICINE

## 2024-09-24 PROCEDURE — 99395 PREV VISIT EST AGE 18-39: CPT | Performed by: FAMILY MEDICINE

## 2024-09-24 NOTE — PROGRESS NOTES
Family Medicine Progress Note    Andres Robert is a 29 year old male.  ASSESSMENT AND PLAN:  Andres was seen today for physical.    Diagnoses and all orders for this visit:    Routine general medical examination at a health care facility  -     CBC With Differential With Platelet; Future  -     Assay, Thyroid Stim Hormone; Future  -     Lipid Panel; Future  -     Comp Metabolic Panel (14); Future    Age appropriate anticipatory guidance reviewed  Health Maintenance   Topic Date Due    Pneumococcal Vaccine: Birth to 64yrs (1 of 2 - PCV) Never done    Annual Physical  11/22/2018    COVID-19 Vaccine (3 - 2023-24 season) 09/01/2024    Influenza Vaccine (1) 10/01/2024    DTaP,Tdap,and Td Vaccines (8 - Td or Tdap) 02/12/2033    Annual Depression Screening  Completed         The patient indicates understanding of these issues and agrees to the plan.  Follow-Up: The patient is asked to  Return in about 1 year (around 9/24/2025) for annual.    .     Shazia Warren MD   09/24/24      HPI:   Andres Robert is a 29 year old male with history of seasonal allergies seen for his annual physical,    States tries to eat healthy but could be better, has not been exercising consistently.    Allergies have been well controlled, has been using the Neti pot and taking his allergy medication.    PAST MEDICAL HISTORY:     Past Medical History:    CHICKEN POX    Chicken pox     PAST SURGICAL HISTORY:     Past Surgical History:   Procedure Laterality Date    Appendectomy  09/2017     ALLERGY:   No Known Allergies  MEDICATIONS:     Current Outpatient Medications   Medication Sig Dispense Refill    pseudoephedrine  MG Oral Tablet 12 Hr Take 1 tablet (120 mg total) by mouth every 12 (twelve) hours. 60 tablet 3    pseudoephedrine 60 MG Oral Tab Take 1 tablet (60 mg total) by mouth every 4 (four) hours as needed for congestion.      loratadine 10 MG Oral Tablet Dispersible Take 1 tablet (10 mg total) by  mouth daily.      Fluticasone Propionate 50 MCG/ACT Nasal Suspension by Each Nare route continuous prn for Rhinitis.       FAMILY HISTORY:     Family History   Problem Relation Age of Onset    Diabetes Maternal Grandfather        SOCIAL HISTORY:     Social History     Socioeconomic History    Marital status:    Tobacco Use    Smoking status: Never    Smokeless tobacco: Never   Vaping Use    Vaping status: Never Used   Substance and Sexual Activity    Alcohol use: Yes     Alcohol/week: 0.0 standard drinks of alcohol     Comment: beer 1 a once a week 2-3 beers    Drug use: No    Sexual activity: Yes     REVIEW OF SYSTEMS:   Review of Systems   Constitutional:  Negative for appetite change, fatigue, fever and unexpected weight change.   HENT:  Negative for congestion, ear pain, hearing loss and sore throat.    Eyes:  Negative for discharge, redness and visual disturbance.   Respiratory:  Negative for cough, chest tightness and shortness of breath.    Cardiovascular:  Negative for chest pain and palpitations.   Gastrointestinal:  Negative for abdominal pain, blood in stool, constipation and nausea.   Endocrine: Negative for cold intolerance, heat intolerance and polyuria.   Genitourinary:  Negative for difficulty urinating, dysuria, frequency and urgency.   Musculoskeletal:  Negative for arthralgias, gait problem, joint swelling and myalgias.   Skin:  Negative for rash.   Allergic/Immunologic: Negative for food allergies.   Neurological:  Negative for dizziness, weakness, numbness and headaches.   Hematological:  Negative for adenopathy. Does not bruise/bleed easily.   Psychiatric/Behavioral:  Negative for dysphoric mood and sleep disturbance. The patient is not nervous/anxious.         PHYSICAL EXAM:   /82   Pulse 84   Temp 97.9 °F (36.6 °C) (Temporal)   Resp 18   Ht 6' (1.829 m)   Wt 231 lb (104.8 kg)   SpO2 98%   BMI 31.33 kg/m²     Physical Exam  Constitutional:       General: He is not in acute  distress.     Appearance: Normal appearance. He is well-developed. He is obese.   HENT:      Head: Normocephalic and atraumatic.      Right Ear: Tympanic membrane, ear canal and external ear normal.      Left Ear: Tympanic membrane, ear canal and external ear normal.      Nose: Nose normal.      Mouth/Throat:      Mouth: Mucous membranes are moist.      Pharynx: Oropharynx is clear.   Eyes:      Extraocular Movements: Extraocular movements intact.      Conjunctiva/sclera: Conjunctivae normal.      Pupils: Pupils are equal, round, and reactive to light.   Neck:      Thyroid: No thyromegaly.   Cardiovascular:      Rate and Rhythm: Normal rate and regular rhythm.      Pulses: Normal pulses.      Heart sounds: Normal heart sounds. No murmur heard.  Pulmonary:      Effort: Pulmonary effort is normal. No respiratory distress.      Breath sounds: Normal breath sounds.   Chest:      Chest wall: No tenderness.   Abdominal:      General: Bowel sounds are normal. There is no distension.      Palpations: Abdomen is soft. There is no hepatomegaly, splenomegaly or mass.      Tenderness: There is no abdominal tenderness.      Hernia: No hernia is present.   Musculoskeletal:         General: Normal range of motion.      Cervical back: Normal range of motion and neck supple.      Right lower leg: No edema.      Left lower leg: No edema.   Lymphadenopathy:      Cervical: No cervical adenopathy.   Skin:     General: Skin is warm.      Findings: No rash.   Neurological:      General: No focal deficit present.      Mental Status: He is alert and oriented to person, place, and time.      Cranial Nerves: No cranial nerve deficit.      Sensory: No sensory deficit.      Motor: No weakness.      Coordination: Coordination normal.      Gait: Gait normal.      Deep Tendon Reflexes: Reflexes are normal and symmetric. Reflexes normal.   Psychiatric:         Mood and Affect: Mood normal.         Behavior: Behavior normal.         Thought Content:  Thought content normal.         Judgment: Judgment normal.             The 21st Century Cures Act makes medical notes like these available to patients in the interest of transparency. Please be advised this is a medical document. Medical documents are intended to carry relevant information, facts as evident, and the clinical opinion of the practitioner. The medical note is intended as peer to peer communication and may appear blunt or direct. It is written in medical language and may contain abbreviations or verbiage that are unfamiliar.     Shazia Warren MD  Family medicine

## 2024-11-23 ENCOUNTER — HOSPITAL ENCOUNTER (OUTPATIENT)
Age: 29
Discharge: HOME | End: 2024-11-23
Payer: COMMERCIAL

## 2024-11-23 VITALS
DIASTOLIC BLOOD PRESSURE: 90 MMHG | HEIGHT: 72 IN | RESPIRATION RATE: 24 BRPM | OXYGEN SATURATION: 99 % | SYSTOLIC BLOOD PRESSURE: 131 MMHG | TEMPERATURE: 98 F | HEART RATE: 85 BPM | BODY MASS INDEX: 30.48 KG/M2 | WEIGHT: 225 LBS

## 2024-11-23 DIAGNOSIS — J32.9 VIRAL SINUSITIS: Primary | ICD-10-CM

## 2024-11-23 DIAGNOSIS — B97.89 VIRAL SINUSITIS: Primary | ICD-10-CM

## 2024-11-23 NOTE — ED PROVIDER NOTES
History     Chief Complaint   Patient presents with    Cough/URI       Subjective:   HPI    Andres Robert, 29 year old male with notable medical history of n/a who presents with sinus pressure, congestion, PND. Patient reports s/s started approx 1-week ago without fever, SOB, PENDLETON, n/v/d. Patient is a  around many sick persons. He is taking pseudoephedrine and using a neti pot with some relief.        Objective:   Past Medical History:    CHICKEN POX    Chicken pox              Past Surgical History:   Procedure Laterality Date    Appendectomy  09/2017                Social History     Socioeconomic History    Marital status:    Tobacco Use    Smoking status: Never    Smokeless tobacco: Never   Vaping Use    Vaping status: Never Used   Substance and Sexual Activity    Alcohol use: Yes     Alcohol/week: 0.0 standard drinks of alcohol     Comment: beer 1 a once a week 2-3 beers    Drug use: No    Sexual activity: Yes              Medications Ordered Prior to Encounter[1]      Review of Systems   HENT:  Positive for congestion and sinus pressure. Negative for sinus pain.    Respiratory:  Positive for cough.    All other systems reviewed and are negative.        Constitutional and vital signs reviewed.      All other systems reviewed and negative except as noted above.    I have reviewed the family history, social history, allergies, and outpatient medications.     History reviewed from EMR: Encounters, problem list, allergies, medications      Physical Exam     ED Triage Vitals [11/23/24 1211]   /90   Pulse 85   Resp 24   Temp 98.3 °F (36.8 °C)   Temp src Temporal   SpO2 99 %   O2 Device None (Room air)       Current:/90   Pulse 85   Temp 98.3 °F (36.8 °C) (Temporal)   Resp 24   Ht 182.9 cm (6')   Wt 102.1 kg   SpO2 99%   BMI 30.52 kg/m²       Physical Exam  Vitals and nursing note reviewed.   Constitutional:       General: He is not in acute distress.     Appearance: Normal  appearance. He is normal weight. He is not ill-appearing or toxic-appearing.   HENT:      Head: Normocephalic and atraumatic.      Right Ear: External ear normal.      Left Ear: External ear normal.      Nose: Congestion present. No rhinorrhea.      Right Turbinates: Swollen.      Left Turbinates: Swollen.      Right Sinus: No maxillary sinus tenderness or frontal sinus tenderness.      Left Sinus: No maxillary sinus tenderness or frontal sinus tenderness.      Mouth/Throat:      Mouth: Mucous membranes are moist.   Eyes:      Extraocular Movements: Extraocular movements intact.      Conjunctiva/sclera: Conjunctivae normal.      Pupils: Pupils are equal, round, and reactive to light.   Cardiovascular:      Rate and Rhythm: Normal rate.      Pulses: Normal pulses.   Pulmonary:      Effort: Pulmonary effort is normal. No respiratory distress.   Musculoskeletal:         General: No swelling, tenderness or signs of injury. Normal range of motion.      Cervical back: Normal range of motion.   Skin:     General: Skin is warm and dry.      Capillary Refill: Capillary refill takes less than 2 seconds.   Neurological:      General: No focal deficit present.      Mental Status: He is alert and oriented to person, place, and time. Mental status is at baseline.      GCS: GCS eye subscore is 4. GCS verbal subscore is 5. GCS motor subscore is 6.   Psychiatric:         Mood and Affect: Mood normal.         Behavior: Behavior normal.         Thought Content: Thought content normal.         Judgment: Judgment normal.            ED Course     Labs Reviewed - No data to display  No orders to display       Vitals:    11/23/24 1211   BP: 131/90   Pulse: 85   Resp: 24   Temp: 98.3 °F (36.8 °C)   TempSrc: Temporal   SpO2: 99%   Weight: 102.1 kg   Height: 182.9 cm (6')            [facility]        Andres Robert, 29 year old male with medical history as noted above who presents with sinus pressure   - Patient in NAD, VSS    - Viral / bacterial  sinusitis vs allergies vs other   - HPI and exam c/w viral etiology   - Supportive care and infection control measures discussed   - f/u with primary care provider as needed       ** See ED course below for additional information on care provided / interventions / notable events throughout patient's encounter.         ** I have independently reviewed the radiology images, clinical lab results, and ECG tracings as described above (if applicable)    ** Concerning co-morbidities possibly affecting complaint / care: n/a    ** See below for home care instructions (if applicable)          Medical Decision Making  Risk  OTC drugs.        Disposition and Plan     Clinical Impression:  1. Viral sinusitis         Disposition:  Discharge  11/23/2024  1:02 pm    Follow-up:  Wilfrid Macario MD  [address] [facility] [address]  [phone]      Ear, Nose, Throat specialist          Medications Prescribed:  Current Discharge Medication List          The above patient (and/or guardian) was made aware that an appropriate evaluation has been performed, and that no additional testing is required at this time. In my medical judgment, there is currently no evidence of an immediate life-threatening or surgical condition, therefore discharge is indicated at this time. The patient (and/or guardian) was advised that a small risk still exists that a serious condition could develop. The patient was instructed to arrange close follow-up with their primary care provider (or the referral provider given today). The patient received written and verbal instructions regarding their condition / concerns, demonstrated understanding, and is agreement with the outpatient treatment plan.        Home care instructions:    How to use nasal sprays: 1-spray into each nostril daily. Use with head tilted down and tip pointed towards outside of eye. Breath normally. You should not feel medication go down your throat.    Upper respiratory infection  supportive care measures to try as applicable:  General:   - There are multiple viruses that cause similar symptoms, including: Influenza, Rhinovirus, Adenovirus, Coronaviruses (including Covid-19), RSV, parainfluenza, etc.   - Duration of symptoms typically depends on your body's ability to heal itself, which can be affected in many ways including metabolic health, diet, and genetics.    - Symptoms typically last between 7-days to 3-weeks   - Most medications do not not cure the illness, but may help to alleviate your symptoms. However, evidence is not strong.   - Antibiotics are NOT effective for viral illnesses   - Wash hands often   - Disinfect your environment, linens, electronics, etc. to limit viral spread   - Change toothbrush to limit viral spread   - Drink plenty of fluids (water, Pedialyte, etc.)   - Get plenty of rest and sleep with head elevated to help with sinus drainage and throat irritation   - Do not share utensils or drinks   - Alternate Ibuprofen (adult: 600mg) and Tylenol (adult: 650-1000mg) as needed for pain / body aches / fever   - Take a multivitamin and extra Vitamin D (~2000IU) daily, year round   - Prophylactic Vitamin C can help reduce symptoms if you become infected, but is less effective when already ill   - You may benefit from Zinc (~20mg) every day, or every other other day, for a week while sick (Zinc has been shown to kill respiratory viruses)    Sore throat:   - Salt water gargles throughout the day for sore throat   - Cepacol lozenges as needed for sore throat    Cough / Sinus:   - Avoid having air blow on your face as this can worsen congestion / cough   - You may benefit from placing a garlic clove in each nostril for 10-15min which should irritate sinuses, causing you to get rid of stuck mucus. Blow your nose thoroughly afterwards   - You may benefit from spoonfuls of honey (and/or added to warm drinks) throughout the day for cough   - You may benefit from taking a  decongestant (e.g. Sudafed - pseudoephedrine [behind the pharmacy counter]) (may temporarily elevate your heart rate and blood pressure)   - You may benefit from using a humidifier and/or steam showers   - You may benefit from Flonase nasal spray daily (Use with head tilted down and tip pointed towards outside of eye. Breath normally. You should not feel medication go down your throat)   - You may benefit from taking a daily allergy medication (e.g. Zyrtec, Xyzal, etc.)   - You may benefit from boiling water with lemon and cayenne pepper, then breathing in the steam (you can cover your head with a towel to help funnel the steam)    General Health Maintenance   - Overall goal: Improve body functioning through improved cellular health by decrease inflammation   - Move more (7k steps a day improves cardiovascular health - approx 1.5hrs of cumulative walking per day)   - Sleep better (consistent bedtime, sleep mask, magnesium glycinate)   - Eat \"real\" food (avoid high processed foods such as: snack foods, margarine, frozen foods, most breads)   - Avoid sugar which is very inflammatory and interferes with cellular function   - Avoid excessive dairy (milk, cheese, etc. [Yogurt is ok])   - Early exposure to daylight   - Drink more water (preferably filtered)   - Prioritize protein with every meal   - Daily multivitamins and extra vitamin D (2000IU) daily, year round   - Yearly health monitoring by your primary care provider         Jorge Cole, DNP, APRN, AGACNP-BC, FNP-C, CNL  Adult-Gerontology Acute Care & Family Nurse Practitioner  [facility]                   [1]   No current facility-administered medications on file prior to encounter.     Current Outpatient Medications on File Prior to Encounter   Medication Sig Dispense Refill    pseudoephedrine 60 MG Oral Tab Take 1 tablet (60 mg total) by mouth every 4 (four) hours as needed for congestion.      loratadine 10 MG Oral Tablet Dispersible Take 1  tablet (10 mg total) by mouth daily.      Fluticasone Propionate 50 MCG/ACT Nasal Suspension by Each Nare route continuous prn for Rhinitis.      pseudoephedrine  MG Oral Tablet 12 Hr Take 1 tablet (120 mg total) by mouth every 12 (twelve) hours. 60 tablet 3

## 2024-11-23 NOTE — DISCHARGE INSTRUCTIONS
How to use nasal sprays: 1-spray into each nostril daily. Use with head tilted down and tip pointed towards outside of eye. Breath normally. You should not feel medication go down your throat.    Upper respiratory infection supportive care measures to try as applicable:  General:   - There are multiple viruses that cause similar symptoms, including: Influenza, Rhinovirus, Adenovirus, Coronaviruses (including Covid-19), RSV, parainfluenza, etc.   - Duration of symptoms typically depends on your body's ability to heal itself, which can be affected in many ways including metabolic health, diet, and genetics.    - Symptoms typically last between 7-days to 3-weeks   - Most medications do not not cure the illness, but may help to alleviate your symptoms. However, evidence is not strong.   - Antibiotics are NOT effective for viral illnesses   - Wash hands often   - Disinfect your environment, linens, electronics, etc. to limit viral spread   - Change toothbrush to limit viral spread   - Drink plenty of fluids (water, Pedialyte, etc.)   - Get plenty of rest and sleep with head elevated to help with sinus drainage and throat irritation   - Do not share utensils or drinks   - Alternate Ibuprofen (adult: 600mg) and Tylenol (adult: 650-1000mg) as needed for pain / body aches / fever   - Take a multivitamin and extra Vitamin D (~2000IU) daily, year round   - Prophylactic Vitamin C can help reduce symptoms if you become infected, but is less effective when already ill   - You may benefit from Zinc (~20mg) every day, or every other other day, for a week while sick (Zinc has been shown to kill respiratory viruses)    Sore throat:   - Salt water gargles throughout the day for sore throat   - Cepacol lozenges as needed for sore throat    Cough / Sinus:   - Avoid having air blow on your face as this can worsen congestion / cough   - You may benefit from placing a garlic clove in each nostril for 10-15min which should irritate sinuses,  causing you to get rid of stuck mucus. Blow your nose thoroughly afterwards   - You may benefit from spoonfuls of honey (and/or added to warm drinks) throughout the day for cough   - You may benefit from taking a decongestant (e.g. Sudafed - pseudoephedrine [behind the pharmacy counter]) (may temporarily elevate your heart rate and blood pressure)   - You may benefit from using a humidifier and/or steam showers   - You may benefit from Flonase nasal spray daily (Use with head tilted down and tip pointed towards outside of eye. Breath normally. You should not feel medication go down your throat)   - You may benefit from taking a daily allergy medication (e.g. Zyrtec, Xyzal, etc.)   - You may benefit from boiling water with lemon and cayenne pepper, then breathing in the steam (you can cover your head with a towel to help funnel the steam)    General Health Maintenance   - Overall goal: Improve body functioning through improved cellular health by decrease inflammation   - Move more (7k steps a day improves cardiovascular health - approx 1.5hrs of cumulative walking per day)   - Sleep better (consistent bedtime, sleep mask, magnesium glycinate)   - Eat \"real\" food (avoid high processed foods such as: snack foods, margarine, frozen foods, most breads)   - Avoid sugar which is very inflammatory and interferes with cellular function   - Avoid excessive dairy (milk, cheese, etc. [Yogurt is ok])   - Early exposure to daylight   - Drink more water (preferably filtered)   - Prioritize protein with every meal   - Daily multivitamins and extra vitamin D (2000IU) daily, year round   - Yearly health monitoring by your primary care provider

## 2024-11-23 NOTE — ED INITIAL ASSESSMENT (HPI)
Pt c/o possible sinus infection started 1 week ago with runny nose headache congestion, post nasal drip, sinus pressure , toothpain

## (undated) DEVICE — STERILE POLYISOPRENE POWDER-FREE SURGICAL GLOVES: Brand: PROTEXIS

## (undated) DEVICE — ENDOPATH XCEL UNIVERSAL TROCAR STABLILITY SLEEVES: Brand: ENDOPATH XCEL

## (undated) DEVICE — TISSUE FUSION LAPAROSCOPIC INSTRUMENT: Brand: LIGASURE ATLAS

## (undated) DEVICE — MONOFILAMENT ABSORBABLE SUTURE: Brand: MAXON

## (undated) DEVICE — ENDOSCOPIC LINEAR CUTTER RELOADS WHITE 2.5 MM: Brand: ECHELON; ENDOPATH

## (undated) DEVICE — ENDOPATH XCEL WITH OPTIVIEW TECHNOLOGY BLADELESS TROCARS WITH STABILITY SLEEVES: Brand: ENDOPATH XCEL OPTIVIEW

## (undated) DEVICE — KENDALL SCD EXPRESS SLEEVES, KNEE LENGTH, MEDIUM: Brand: KENDALL SCD

## (undated) DEVICE — TROCAR BLADELESS 12MM B12LT

## (undated) DEVICE — ANCHOR TISSUE RETRIEVAL SYSTEM, BAG SIZE 175 ML, PORT SIZE 10 MM: Brand: ANCHOR TISSUE RETRIEVAL SYSTEM

## (undated) DEVICE — ENDOPATH XCEL DILATING TIP TROCARS WITH STABILITY SLEEVES: Brand: ENDOPATH XCEL

## (undated) DEVICE — 2, DISPOSABLE SUCTION/IRRIGATOR WITHOUT DISPOSABLE TIP: Brand: STRYKEFLOW

## (undated) DEVICE — PAD SACRAL SPAN AID

## (undated) DEVICE — SUTURE MONOCRYL 4-0 PS-2

## (undated) DEVICE — THE ECHELON FLEX POWERED PLUS ARTICULATING ENDOSCOPIC LINEAR CUTTERS ARE STERILE, SINGLE PATIENT USE INSTRUMENTS THAT SIMULTANEOUSLYCUT AND STAPLE TISSUE. THERE ARE SIX STAGGERED ROWS OF STAPLES, THREE ON EITHER SIDE OF THE CUT LINE. THE ECHELON FLEX 45 POWERED PLUSINSTRUMENTS HAVE A STAPLE LINE THAT IS APPROXIMATELY 45 MM LONG AND A CUT LINE THAT IS APPROXIMATELY 42 MM LONG. THE SHAFT CAN ROTATE FREELYIN BOTH DIRECTIONS AND AN ARTICULATION MECHANISM ENABLES THE DISTAL PORTION OF THE SHAFT TO PIVOT TO FACILITATE LATERAL ACCESS TO THE OPERATIVESITE.THE INSTRUMENTS ARE PACKAGED WITH A PRIMARY LITHIUM BATTERY PACK THAT MUST BE INSTALLED PRIOR TO USE. THERE ARE SPECIFIC REQUIREMENTS FORDISPOSING OF THE BATTERY PACK. REFER TO THE BATTERY PACK DISPOSAL SECTION.THE INSTRUMENTS ARE PACKAGED WITHOUT A RELOAD AND MUST BE LOADED PRIOR TO USE. A STAPLE RETAINING CAP ON THE RELOAD PROTECTS THE STAPLE LEGPOINTS DURING SHIPPING AND TRANSPORTATION. THE INSTRUMENTS’ LOCK-OUT FEATURE IS DESIGNED TO PREVENT A USED OR IMPROPERLY INSTALLED RELOADFROM BEING REFIRED OR AN INSTRUMENT FROM BEING FIRED WITHOUT A RELOAD.: Brand: ECHELON FLEX

## (undated) DEVICE — LAP CHOLE/APPY CDS-LF: Brand: MEDLINE INDUSTRIES, INC.

## (undated) DEVICE — SOL  .9 1000ML BTL

## (undated) DEVICE — CAUTERY PENCIL

## (undated) NOTE — LETTER
01/02/18        Bren Anthony      Dear Francoise Raza records indicate that you have outstanding lab work and or testing that was ordered for you and has not yet been completed:          CBC W Differential W Platelet

## (undated) NOTE — LETTER
[facility]  [id number] [facility], [address] [place] [address]    Consent for Operation    Date: __________________    Time: _______________    1.  I authorize the performance upon Michael Hands the following operation:    Procedure(s):  LAPAROSCOPIC APPENDECTO procedure has been videotaped, the surgeon will obtain the original videotape. The hospital will not be responsible for storage or maintenance of this tape.     6. For the purpose of advancing medical education, I consent to the admittance of observers to t STATEMENTS REQUIRING INSERTION OR COMPLETION WERE FILLED IN.     Signature of Patient:   ___________________________    When the patient is a minor or mentally incompetent to give consent:  Signature of person authorized to consent for patient: ____________ drugs/illegal medications). Failure to inform my anesthesiologist about these medicines may increase my risk of anesthetic complications. · If I am allergic to anything or have had a reaction to anesthesia before.     3. I understand how the anesthesia med I have discussed the procedure and information above with the patient (or patient’s representative) and answered their questions. The patient or their representative has agreed to have anesthesia services.     _______________________________________________